# Patient Record
Sex: FEMALE | Race: WHITE | NOT HISPANIC OR LATINO | Employment: FULL TIME | URBAN - METROPOLITAN AREA
[De-identification: names, ages, dates, MRNs, and addresses within clinical notes are randomized per-mention and may not be internally consistent; named-entity substitution may affect disease eponyms.]

---

## 2017-01-12 ENCOUNTER — TELEPHONE (OUTPATIENT)
Dept: FAMILY MEDICINE | Facility: CLINIC | Age: 57
End: 2017-01-12

## 2017-01-12 NOTE — TELEPHONE ENCOUNTER
Patient called for RN HTN Panel Management.  Can come in for no-charge RN BP check or BP check at a [facility].  Left message for patient to return call to clinic.      Rosalinda Kaur RN

## 2017-01-13 NOTE — TELEPHONE ENCOUNTER
Patient is contacted and offered free bp ck with the RN.  Patient states she actually needs a physical and will call back to schedule as she is busy with her mother right now. Tosha ADAM RN

## 2017-04-17 ENCOUNTER — TELEPHONE (OUTPATIENT)
Dept: FAMILY MEDICINE | Facility: CLINIC | Age: 57
End: 2017-04-17

## 2017-04-17 DIAGNOSIS — I10 ESSENTIAL HYPERTENSION, BENIGN: ICD-10-CM

## 2017-04-17 RX ORDER — ATENOLOL 50 MG/1
TABLET ORAL
Qty: 90 TABLET | Refills: 0 | Status: CANCELLED | OUTPATIENT
Start: 2017-04-17

## 2017-04-17 NOTE — TELEPHONE ENCOUNTER
Atenolol      Last Written Prescription Date: 04/08/16  Last Fill Quantity: 90, # refills: 3    Last Office Visit with [facility], [facility] or [facility] prescribing provider:  04/08/16   Future Office Visit:    Next 5 appointments (look out 90 days)     Apr 21, 2017  9:00 AM CDT   PHYSICAL with Margarita Hope DO   [facility]    [address] [address]   [phone]                    BP Readings from Last 3 Encounters:   04/08/16 (!) 154/94   05/01/15 173/89   02/20/15 148/82

## 2017-04-20 NOTE — TELEPHONE ENCOUNTER
Reason for Call:  Other prescription    Detailed comments: Pt called and needs Atenolol refill today - She is out of this med.  Please call patient and advise.        Phone Number Patient can be reached at: Cell number on file:    Telephone Information:   Mobile [phone]       Best Time: any    Can we leave a detailed message on this number? YES    Call taken on 4/20/2017 at 12:49 PM by Sujatha Blanco

## 2017-04-21 ENCOUNTER — MYC MEDICAL ADVICE (OUTPATIENT)
Dept: FAMILY MEDICINE | Facility: CLINIC | Age: 57
End: 2017-04-21

## 2017-04-21 DIAGNOSIS — I10 ESSENTIAL HYPERTENSION, BENIGN: ICD-10-CM

## 2017-04-21 RX ORDER — ATENOLOL 50 MG/1
50 TABLET ORAL DAILY
Qty: 30 TABLET | Refills: 0 | Status: SHIPPED | OUTPATIENT
Start: 2017-04-21 | End: 2017-05-05

## 2017-04-21 NOTE — TELEPHONE ENCOUNTER
Patient takes BP at home, last BP was 4/19/17;  /90,  before she was out of medication  She has been out of Atenolol for two days  Appt scheduled with PCP 5/5/17  Advised patient to make RN clinic appt to take BP  Patient agreed with plan, transferred patient to appt line to make Rn clinic appt  Rx ordered, 30 tabs/0 refills    Atenolol  Last Written Prescription Date: 4/8/16  Last Fill Quantity: 90, # refills: 3    Last Office Visit with [facility], P or [facility] prescribing provider:  4/8/16  Future Office Visit:    Next 5 appointments (look out 90 days)     May 05, 2017  9:20 AM CDT   Javid Remy with Margariat Hope DO   [facility]    [address] [address]   [phone]                    BP Readings from Last 3 Encounters:   04/08/16 (!) 154/94   05/01/15 173/89   02/20/15 148/82     Nancy FIELD Rn

## 2017-04-21 NOTE — TELEPHONE ENCOUNTER
Medication is being filled for 1 time refill only due to:  Patient needs to be seen because it has been more than one year since last visit. Tosha ADAM RN

## 2017-04-26 ENCOUNTER — ALLIED HEALTH/NURSE VISIT (OUTPATIENT)
Dept: FAMILY MEDICINE | Facility: CLINIC | Age: 57
End: 2017-04-26
Payer: COMMERCIAL

## 2017-04-26 VITALS — SYSTOLIC BLOOD PRESSURE: 174 MMHG | HEART RATE: 73 BPM | DIASTOLIC BLOOD PRESSURE: 92 MMHG

## 2017-04-26 DIAGNOSIS — Z01.30 BLOOD PRESSURE CHECK: Primary | ICD-10-CM

## 2017-04-26 PROCEDURE — 99207 ZZC NO CHARGE NURSE ONLY: CPT

## 2017-04-26 NOTE — NURSING NOTE
Patient is here for a blood pressure check.  She monitors her blood pressures at home.  Patient has brought in some home readings today and they are 120's-130's/70's-80's when she is taking her medications.  Patient reports that she is feeling well.  Blood pressure taken x2 today and both readings are above goal.  Patient reports that her blood pressures always run higher when she comes into clinic and she has had her home cuff checked against the clinic cuff two times in the past 3 years.  Patient has appointment scheduled with PCP next week as it has been over a year since her last OV.    Sandy Ralph RN

## 2017-04-26 NOTE — MR AVS SNAPSHOT
After Visit Summary   4/26/2017    Mechelle Cee    MRN: [id number]           Patient Information     Date Of Birth          1960        Visit Information        Provider Department      4/26/2017 4:00 PM RIVAS LIMA/MESHA GUTIERREZ [facility]        Today's Diagnoses     Blood pressure check    -  1       Follow-ups after your visit        Your next 10 appointments already scheduled     May 05, 2017  9:20 AM CDT   Javid Remy with Margarita Hope, DO   [facility]    [address] [address]   [phone]              Who to contact     If you have questions or need follow up information about today's clinic visit or your schedule please contact [facility] directly at [phone].  Normal or non-critical lab and imaging results will be communicated to you by MyChart, letter or phone within 4 business days after the clinic has received the results. If you do not hear from us within 7 days, please contact the clinic through [employer] or phone. If you have a critical or abnormal lab result, we will notify you by phone as soon as possible.  Submit refill requests through [employer] or call your pharmacy and they will forward the refill request to us. Please allow 3 business days for your refill to be completed.          Additional Information About Your Visit        MyChart Information     [employer] gives you secure access to your electronic health record. If you see a primary care provider, you can also send messages to your care team and make appointments. If you have questions, please call your primary care clinic.  If you do not have a primary care provider, please call [phone] and they will assist you.        Care EveryWhere ID     This is your Care EveryWhere ID. This could be used by other organizations to access your [facility] medical records  [id number]        Your Vitals Were     Pulse                   73             Blood Pressure from Last 3 Encounters:   04/26/17 (!) 174/92   04/08/16 (!) 154/94   05/01/15 173/89    Weight from Last 3 Encounters:   04/08/16 167 lb 11.2 oz (76.1 kg)   05/01/15 160 lb (72.6 kg)   02/20/15 158 lb 6.4 oz (71.8 kg)              Today, you had the following     No orders found for display       Primary Care Provider Office Phone # Fax #    Margarita CollazoDO yancy [phone]       [facility] [address] [address]        Thank you!     Thank you for choosing [facility]  for your care. Our goal is always to provide you with excellent care. Hearing back from our patients is one way we can continue to improve our services. Please take a few minutes to complete the written survey that you may receive in the mail after your visit with us. Thank you!             Your Updated Medication List - Protect others around you: Learn how to safely use, store and throw away your medicines at www.disposemymeds.org.          This list is accurate as of: 4/26/17  4:21 PM.  Always use your most recent med list.                   Brand Name Dispense Instructions for use    atenolol 50 MG tablet    TENORMIN    30 tablet    Take 1 tablet (50 mg) by mouth daily       hydrochlorothiazide 25 MG tablet    HYDRODIURIL    90 tablet    Take 1 tablet (25 mg) by mouth daily       omeprazole 40 MG capsule    priLOSEC    30 capsule    Take 1 capsule (40 mg) by mouth daily Take 30-60 minutes before a meal.

## 2017-05-05 ENCOUNTER — OFFICE VISIT (OUTPATIENT)
Dept: FAMILY MEDICINE | Facility: CLINIC | Age: 57
End: 2017-05-05
Payer: COMMERCIAL

## 2017-05-05 VITALS
SYSTOLIC BLOOD PRESSURE: 143 MMHG | HEART RATE: 85 BPM | BODY MASS INDEX: 33.18 KG/M2 | DIASTOLIC BLOOD PRESSURE: 96 MMHG | HEIGHT: 60 IN | WEIGHT: 169 LBS | TEMPERATURE: 99.1 F

## 2017-05-05 DIAGNOSIS — Z12.4 SCREENING FOR MALIGNANT NEOPLASM OF CERVIX: ICD-10-CM

## 2017-05-05 DIAGNOSIS — Z11.59 NEED FOR HEPATITIS C SCREENING TEST: ICD-10-CM

## 2017-05-05 DIAGNOSIS — Z12.11 SPECIAL SCREENING FOR MALIGNANT NEOPLASMS, COLON: ICD-10-CM

## 2017-05-05 DIAGNOSIS — Z12.31 ENCOUNTER FOR SCREENING MAMMOGRAM FOR BREAST CANCER: ICD-10-CM

## 2017-05-05 DIAGNOSIS — I10 ESSENTIAL HYPERTENSION: ICD-10-CM

## 2017-05-05 DIAGNOSIS — E78.5 HYPERLIPIDEMIA LDL GOAL <100: ICD-10-CM

## 2017-05-05 DIAGNOSIS — R05.3 CHRONIC COUGH: ICD-10-CM

## 2017-05-05 DIAGNOSIS — Z00.00 ENCOUNTER FOR GENERAL ADULT MEDICAL EXAMINATION WITHOUT ABNORMAL FINDINGS: Primary | ICD-10-CM

## 2017-05-05 LAB
ANION GAP SERPL CALCULATED.3IONS-SCNC: 9 MMOL/L (ref 3–14)
BUN SERPL-MCNC: 13 MG/DL (ref 7–30)
CALCIUM SERPL-MCNC: 8.9 MG/DL (ref 8.5–10.1)
CHLORIDE SERPL-SCNC: 99 MMOL/L (ref 94–109)
CHOLEST SERPL-MCNC: 270 MG/DL
CO2 SERPL-SCNC: 30 MMOL/L (ref 20–32)
CREAT SERPL-MCNC: 0.78 MG/DL (ref 0.52–1.04)
GFR SERPL CREATININE-BSD FRML MDRD: 77 ML/MIN/1.7M2
GLUCOSE SERPL-MCNC: 98 MG/DL (ref 70–99)
HCV AB SERPL QL IA: NORMAL
HDLC SERPL-MCNC: 58 MG/DL
LDLC SERPL CALC-MCNC: 185 MG/DL
NONHDLC SERPL-MCNC: 212 MG/DL
POTASSIUM SERPL-SCNC: 3.7 MMOL/L (ref 3.4–5.3)
SODIUM SERPL-SCNC: 138 MMOL/L (ref 133–144)
TRIGL SERPL-MCNC: 136 MG/DL

## 2017-05-05 PROCEDURE — 99396 PREV VISIT EST AGE 40-64: CPT | Performed by: INTERNAL MEDICINE

## 2017-05-05 PROCEDURE — 87624 HPV HI-RISK TYP POOLED RSLT: CPT | Performed by: INTERNAL MEDICINE

## 2017-05-05 PROCEDURE — 80061 LIPID PANEL: CPT | Performed by: INTERNAL MEDICINE

## 2017-05-05 PROCEDURE — 86803 HEPATITIS C AB TEST: CPT | Performed by: INTERNAL MEDICINE

## 2017-05-05 PROCEDURE — 36415 COLL VENOUS BLD VENIPUNCTURE: CPT | Performed by: INTERNAL MEDICINE

## 2017-05-05 PROCEDURE — G0145 SCR C/V CYTO,THINLAYER,RESCR: HCPCS | Performed by: INTERNAL MEDICINE

## 2017-05-05 PROCEDURE — 80048 BASIC METABOLIC PNL TOTAL CA: CPT | Performed by: INTERNAL MEDICINE

## 2017-05-05 RX ORDER — HYDROCHLOROTHIAZIDE 25 MG/1
25 TABLET ORAL DAILY
Qty: 90 TABLET | Refills: 3 | Status: SHIPPED | OUTPATIENT
Start: 2017-05-05 | End: 2018-07-17

## 2017-05-05 RX ORDER — ATENOLOL 100 MG/1
100 TABLET ORAL DAILY
Qty: 90 TABLET | Refills: 3 | Status: SHIPPED | OUTPATIENT
Start: 2017-05-05 | End: 2017-09-19

## 2017-05-05 RX ORDER — OMEPRAZOLE 40 MG/1
40 CAPSULE, DELAYED RELEASE ORAL DAILY
Qty: 90 CAPSULE | Refills: 3 | Status: SHIPPED | OUTPATIENT
Start: 2017-05-05 | End: 2018-11-09

## 2017-05-05 NOTE — NURSING NOTE
"Chief Complaint   Patient presents with     Physical     pap     Hypertension     recheck     Refill Request       Initial /89 (BP Location: Left arm, Patient Position: Chair, Cuff Size: Adult Large)  Pulse 96  Temp 99.1  F (37.3  C) (Tympanic)  Ht 5' 0.25\" (1.53 m)  Wt 169 lb (76.7 kg)  BMI 32.73 kg/m2 Estimated body mass index is 32.73 kg/(m^2) as calculated from the following:    Height as of this encounter: 5' 0.25\" (1.53 m).    Weight as of this encounter: 169 lb (76.7 kg).  Medication Reconciliation: complete  "

## 2017-05-05 NOTE — MR AVS SNAPSHOT
After Visit Summary   5/5/2017    Mechelle Cee    MRN: [id number]           Patient Information     Date Of Birth          1960        Visit Information        Provider Department      5/5/2017 9:20 AM Margarita Hope,  [facility]        Today's Diagnoses     Encounter for general adult medical examination without abnormal findings    -  1    Chronic cough        Essential hypertension        Need for hepatitis C screening test        Screening for malignant neoplasm of cervix        Encounter for screening mammogram for breast cancer        Special screening for malignant neoplasms, colon        Hyperlipidemia LDL goal <100          Care Instructions          Thank you for choosing [facility].  You may be receiving a survey in the mail from [employer] regarding your visit today.  Please take a few minutes to complete and return the survey to let us know how we are doing.      If you have questions or concerns, please contact us via [employer] or you can contact your care team at [phone].    Our Clinic hours are:  Monday 6:40 am  to 7:00 pm  Tuesday -Friday 6:40 am to 5:00 pm    The [place] outpatient lab hours are:  Monday - Friday 6:10 am to 4:45 pm  Saturdays 7:00 am to 11:00 am  Appointments are required, call [phone]    If you have clinical questions after hours or would like to schedule an appointment,  call the clinic at [phone].      [facility] [address] [place] [address] [address]     Preventive Health Recommendations  Female Ages 50 - 64    Yearly exam: See your health care provider every year in order to  o Review health changes.   o Discuss preventive care.    o Review your medicines if your doctor has prescribed any.      Get a Pap test every three years (unless you have an abnormal result and your provider advises testing more often).    If you get Pap tests with HPV test, you only need to test every 5 years, unless you have an  abnormal result.     You do not need a Pap test if your uterus was removed (hysterectomy) and you have not had cancer.    You should be tested each year for STDs (sexually transmitted diseases) if you're at risk.     Have a mammogram every 1 to 2 years.    Have a colonoscopy at age 50, or have a yearly FIT test (stool test). These exams screen for colon cancer.      Have a cholesterol test every 5 years, or more often if advised.    Have a diabetes test (fasting glucose) every three years. If you are at risk for diabetes, you should have this test more often.     If you are at risk for osteoporosis (brittle bone disease), think about having a bone density scan (DEXA).    Shots: Get a flu shot each year. Get a tetanus shot every 10 years.    Nutrition:     Eat at least 5 servings of fruits and vegetables each day.    Eat whole-grain bread, whole-wheat pasta and brown rice instead of white grains and rice.    Talk to your provider about Calcium and Vitamin D.     Lifestyle    Exercise at least 150 minutes a week (30 minutes a day, 5 days a week). This will help you control your weight and prevent disease.    Limit alcohol to one drink per day.    No smoking.     Wear sunscreen to prevent skin cancer.     See your dentist every six months for an exam and cleaning.    See your eye doctor every 1 to 2 years.      1. If the episodes of palpitations and dizziness persist, may have you wear a heart monitor.  2. Increase atenolol to 100 mg once daily.  Let Dr. Hope know if you have side effects  3. Blood work today  4. Return to clinic 1 year  5. Return FIT test  6. You are due for a mammogram.  Please call [phone] to schedule.  7. Take blood pressure after resting for 5 min, then take every 1 min for 2-3 readings and average reading.  8. Black cohosh for hot flashes  9. Return to clinic if you want skin tags removed        Follow-ups after your visit        Future tests that were ordered for you today     Open Future  "Orders        Priority Expected Expires Ordered    *MA Screening Digital Bilateral Routine  5/5/2018 5/5/2017    Fecal colorectal cancer screen (FIT) Routine 5/26/2017 7/28/2017 5/5/2017            Who to contact     If you have questions or need follow up information about today's clinic visit or your schedule please contact [facility] directly at [phone].  Normal or non-critical lab and imaging results will be communicated to you by [employer], letter or phone within 4 business days after the clinic has received the results. If you do not hear from us within 7 days, please contact the clinic through [employer] or phone. If you have a critical or abnormal lab result, we will notify you by phone as soon as possible.  Submit refill requests through [employer] or call your pharmacy and they will forward the refill request to us. Please allow 3 business days for your refill to be completed.          Additional Information About Your Visit        [employer] Information     [employer] gives you secure access to your electronic health record. If you see a primary care provider, you can also send messages to your care team and make appointments. If you have questions, please call your primary care clinic.  If you do not have a primary care provider, please call [phone] and they will assist you.        Care EveryWhere ID     This is your Care EveryWhere ID. This could be used by other organizations to access your [facility] medical records  [id number]        Your Vitals Were     Pulse Temperature Height BMI (Body Mass Index)          96 99.1  F (37.3  C) (Tympanic) 5' 0.25\" (1.53 m) 32.73 kg/m2         Blood Pressure from Last 3 Encounters:   05/05/17 149/89   04/26/17 (!) 174/92   04/08/16 (!) 154/94    Weight from Last 3 Encounters:   05/05/17 169 lb (76.7 kg)   04/08/16 167 lb 11.2 oz (76.1 kg)   05/01/15 160 lb (72.6 kg)              We Performed the Following     Basic metabolic panel     Hepatitis C antibody  "    HPV High Risk Types DNA Cervical     Lipid Profile (Chol, Trig, HDL, LDL calc)     Pap imaged thin layer screen with HPV - recommended age 30 - 65 years (select HPV order below)          Today's Medication Changes          These changes are accurate as of: 5/5/17 10:12 AM.  If you have any questions, ask your nurse or doctor.               These medicines have changed or have updated prescriptions.        Dose/Directions    atenolol 100 MG tablet   Commonly known as:  TENORMIN   This may have changed:    - medication strength  - how much to take   Used for:  Essential hypertension   Changed by:  Margarita Hope DO        Dose:  100 mg   Take 1 tablet (100 mg) by mouth daily   Quantity:  90 tablet   Refills:  3            Where to get your medicines      These medications were sent to [facility] - [place], [place] - [address]   [address] [facility] [address]     Phone:  [phone]     atenolol 100 MG tablet    hydrochlorothiazide 25 MG tablet    omeprazole 40 MG capsule                Primary Care Provider Office Phone # Fax #    Margarita Hope [phone]       [facility] [address] [address]        Thank you!     Thank you for choosing [facility]  for your care. Our goal is always to provide you with excellent care. Hearing back from our patients is one way we can continue to improve our services. Please take a few minutes to complete the written survey that you may receive in the mail after your visit with us. Thank you!             Your Updated Medication List - Protect others around you: Learn how to safely use, store and throw away your medicines at www.disposemymeds.org.          This list is accurate as of: 5/5/17 10:12 AM.  Always use your most recent med list.                   Brand Name Dispense Instructions for use    atenolol 100 MG tablet    TENORMIN    90 tablet    Take 1 tablet (100 mg) by mouth  daily       hydrochlorothiazide 25 MG tablet    HYDRODIURIL    90 tablet    Take 1 tablet (25 mg) by mouth daily       omeprazole 40 MG capsule    priLOSEC    90 capsule    Take 1 capsule (40 mg) by mouth daily Take 30-60 minutes before a meal.

## 2017-05-05 NOTE — PROGRESS NOTES
SUBJECTIVE:     CC: Mechelle Cee is an 57 year old woman who presents for preventive health visit.     Healthy Habits:    Do you get at least three servings of calcium containing foods daily (dairy, green leafy vegetables, etc.)? yes    Amount of exercise or daily activities, outside of work: 1-2 day(s) per week    Problems taking medications regularly No    Medication side effects: No    Have you had an eye exam in the past two years? yes    Do you see a dentist twice per year? yes    Do you have sleep apnea, excessive snoring or daytime drowsiness?no    Chief Complaint   Patient presents with     Physical     pap     Hypertension     recheck     Refill Request     Hypertension:  Uncontrolled.  130-157.  Has new cuff.  Our cuff checked against her, ours was 149/89, hers 163/100  Taking meds in PM. Feeling palpitations and occasional dizziness.  Occurs every 2 weeks or so.  Pulse 70s mostly.  Episodes last < 1 min.  She gets sensation that she has to cough, and she does, and episode can resolve.    Cough: thinks due to acid reflux. prilosec helps, only taking PRN.      Today's PHQ-2 Score:   PHQ-2 ( 1999 [employer]) 5/5/2017 4/18/2017   Q1: Little interest or pleasure in doing things 0 -   Q2: Feeling down, depressed or hopeless 0 -   PHQ-2 Score 0 -   Little interest or pleasure in doing things - Not at all   Feeling down, depressed or hopeless - Not at all   PHQ-2 Score - 0       Abuse: Current or Past(Physical, Sexual or Emotional)- No  Do you feel safe in your environment - Yes    Social History   Substance Use Topics     Smoking status: Never Smoker     Smokeless tobacco: Never Used     Alcohol use Yes      Comment: beer or wine 3-4 a week     The patient does not drink >3 drinks per day nor >7 drinks per week.    Recent Labs   Lab Test  02/10/15   0934   CHOL  200*   HDL  54   LDL  126   TRIG  102   CHOLHDLRATIO  3.7       Reviewed orders with patient.  Reviewed health maintenance and updated orders  accordingly - No    Mammo Decision Support:  Patient over age 50, mutual decision to screen reflected in health maintenance.    Pertinent mammograms are reviewed under the imaging tab.  History of abnormal Pap smear: NO - age 30- 65 PAP every 3 years recommended    Reviewed and updated as needed this visit by clinical staff  Tobacco  Allergies         Reviewed and updated as needed this visit by Provider          ROS: negative except as noted in HPI  C: NEGATIVE for fever, chills, change in weight  I: NEGATIVE for worrisome rashes, moles or lesions  E: NEGATIVE for vision changes or irritation  ENT: NEGATIVE for ear, mouth and throat problems  R: NEGATIVE for significant cough or SOB  B: NEGATIVE for masses, tenderness or discharge  CV: NEGATIVE for chest pain, palpitations or peripheral edema  GI: NEGATIVE for nausea, abdominal pain, heartburn, or change in bowel habits  : NEGATIVE for unusual urinary or vaginal symptoms. No vaginal bleeding.  M: NEGATIVE for significant arthralgias or myalgia  N: NEGATIVE for weakness, dizziness or paresthesias  P: NEGATIVE for changes in mood or affect     Problem list, Medication list, Allergies, and Medical/Social/Surgical histories reviewed in [facility] and updated as appropriate.  Labs reviewed in [facility]  Current Outpatient Prescriptions   Medication Sig Dispense Refill     omeprazole (PRILOSEC) 40 MG capsule Take 1 capsule (40 mg) by mouth daily Take 30-60 minutes before a meal. 90 capsule 3     hydrochlorothiazide (HYDRODIURIL) 25 MG tablet Take 1 tablet (25 mg) by mouth daily 90 tablet 3     atenolol (TENORMIN) 100 MG tablet Take 1 tablet (100 mg) by mouth daily 90 tablet 3     [DISCONTINUED] atenolol (TENORMIN) 50 MG tablet Take 1 tablet (50 mg) by mouth daily 30 tablet 0     [DISCONTINUED] hydrochlorothiazide (HYDRODIURIL) 25 MG tablet Take 1 tablet (25 mg) by mouth daily 90 tablet 1     OBJECTIVE:     /89 (BP Location: Left arm, Patient Position: Chair, Cuff Size:  "Adult Large)  Pulse 96  Temp 99.1  F (37.3  C) (Tympanic)  Ht 5' 0.25\" (1.53 m)  Wt 169 lb (76.7 kg)  BMI 32.73 kg/m2  EXAM:  GENERAL APPEARANCE: healthy, alert and no distress  EYES: Eyes grossly normal to inspection, PERRL and conjunctivae and sclerae normal  HENT: ear canals and TM's normal, nose and mouth without ulcers or lesions, oropharynx clear and oral mucous membranes moist  NECK: no adenopathy, no asymmetry, masses, or scars and thyroid normal to palpation  RESP: lungs clear to auscultation - no rales, rhonchi or wheezes  BREAST: normal without masses, tenderness or nipple discharge and no palpable axillary masses or adenopathy  CV: regular rate and rhythm, normal S1 S2, no S3 or S4, no murmur, click or rub, no peripheral edema and peripheral pulses strong  ABDOMEN: soft, nontender, no hepatosplenomegaly, no masses and bowel sounds normal   (female): normal female external genitalia, normal urethral meatus, vaginal mucosal atrophy noted, normal cervix, adnexae, and uterus without masses or abnormal discharge.  Pap collected  MS: no musculoskeletal defects are noted and gait is age appropriate without ataxia  SKIN: no suspicious lesions or rashes.  Skin tags in axilla bilateral and inner upper thighs  NEURO: Normal strength and tone, sensory exam grossly normal, mentation intact and speech normal  PSYCH: mentation appears normal and affect normal/bright    ASSESSMENT/PLAN:     1. Encounter for general adult medical examination without abnormal findings    2. Chronic cough well-controlled with intermittent PPI.   - omeprazole (PRILOSEC) 40 MG capsule; Take 1 capsule (40 mg) by mouth daily Take 30-60 minutes before a meal.  Dispense: 90 capsule; Refill: 3    3. Essential hypertension -  uncontrolled. Increase atenolol to 100 mg. RN BP check in 2 weeks. Let us know if side effects   - hydrochlorothiazide (HYDRODIURIL) 25 MG tablet; Take 1 tablet (25 mg) by mouth daily  Dispense: 90 tablet; Refill: 3  - " "atenolol (TENORMIN) 100 MG tablet; Take 1 tablet (100 mg) by mouth daily  Dispense: 90 tablet; Refill: 3  - Basic metabolic panel    4. Need for hepatitis C screening test  - Hepatitis C antibody    5. Screening for malignant neoplasm of cervix  - Pap imaged thin layer screen with HPV - recommended age 30 - 65 years (select HPV order below)  - HPV High Risk Types DNA Cervical    6. Encounter for screening mammogram for breast cancer  - *MA Screening Digital Bilateral; Future    7. Special screening for malignant neoplasms, colon  - Fecal colorectal cancer screen (FIT); Future    8. Hyperlipidemia LDL goal <100  - Lipid Profile (Chol, Trig, HDL, LDL calc)    COUNSELING:   Reviewed preventive health counseling, as reflected in patient instructions         reports that she has never smoked. She has never used smokeless tobacco.    Estimated body mass index is 32.73 kg/(m^2) as calculated from the following:    Height as of this encounter: 5' 0.25\" (1.53 m).    Weight as of this encounter: 169 lb (76.7 kg).   Weight management plan: Discussed healthy diet and exercise guidelines and patient will follow up in 12 months in clinic to re-evaluate.    Counseling Resources:  ATP IV Guidelines  Pooled Cohorts Equation Calculator  Breast Cancer Risk Calculator  FRAX Risk Assessment  ICSI Preventive Guidelines  Dietary Guidelines for Americans, 2010  USDA's MyPlate  ASA Prophylaxis  Lung CA Screening    Margarita Hope,   [facility]  "

## 2017-05-05 NOTE — PATIENT INSTRUCTIONS
Thank you for choosing [facility].  You may be receiving a survey in the mail from Vicki Anderson regarding your visit today.  Please take a few minutes to complete and return the survey to let us know how we are doing.      If you have questions or concerns, please contact us via [employer] or you can contact your care team at [phone].    Our Clinic hours are:  Monday 6:40 am  to 7:00 pm  Tuesday -Friday 6:40 am to 5:00 pm    The [place] outpatient lab hours are:  Monday - Friday 6:10 am to 4:45 pm  Saturdays 7:00 am to 11:00 am  Appointments are required, call [phone]    If you have clinical questions after hours or would like to schedule an appointment,  call the clinic at [phone].      [facility] [address] [place] [address] [address]     Preventive Health Recommendations  Female Ages 50 - 64    Yearly exam: See your health care provider every year in order to  o Review health changes.   o Discuss preventive care.    o Review your medicines if your doctor has prescribed any.      Get a Pap test every three years (unless you have an abnormal result and your provider advises testing more often).    If you get Pap tests with HPV test, you only need to test every 5 years, unless you have an abnormal result.     You do not need a Pap test if your uterus was removed (hysterectomy) and you have not had cancer.    You should be tested each year for STDs (sexually transmitted diseases) if you're at risk.     Have a mammogram every 1 to 2 years.    Have a colonoscopy at age 50, or have a yearly FIT test (stool test). These exams screen for colon cancer.      Have a cholesterol test every 5 years, or more often if advised.    Have a diabetes test (fasting glucose) every three years. If you are at risk for diabetes, you should have this test more often.     If you are at risk for osteoporosis (brittle bone disease), think about having a bone density scan (DEXA).    Shots: Get a flu shot each  year. Get a tetanus shot every 10 years.    Nutrition:     Eat at least 5 servings of fruits and vegetables each day.    Eat whole-grain bread, whole-wheat pasta and brown rice instead of white grains and rice.    Talk to your provider about Calcium and Vitamin D.     Lifestyle    Exercise at least 150 minutes a week (30 minutes a day, 5 days a week). This will help you control your weight and prevent disease.    Limit alcohol to one drink per day.    No smoking.     Wear sunscreen to prevent skin cancer.     See your dentist every six months for an exam and cleaning.    See your eye doctor every 1 to 2 years.      1. If the episodes of palpitations and dizziness persist, may have you wear a heart monitor.  2. Increase atenolol to 100 mg once daily.  Let Dr. Hope know if you have side effects  3. Blood work today  4. Return to clinic 1 year  5. Return FIT test  6. You are due for a mammogram.  Please call [phone] to schedule.  7. Take blood pressure after resting for 5 min, then take every 1 min for 2-3 readings and average reading.  8. Black cohosh for hot flashes  9. Return to clinic if you want skin tags removed

## 2017-05-08 DIAGNOSIS — E78.5 HYPERLIPIDEMIA LDL GOAL <160: Primary | ICD-10-CM

## 2017-05-09 LAB
COPATH REPORT: NORMAL
PAP: NORMAL

## 2017-05-09 NOTE — PROGRESS NOTES
Negative for Hepatitis C.  Kidney function, blood sugar, electrolytes are normal.  Cholesterol is extremely high compared to previous.  It is in the danger zone.  Recommend to try lifestyle changes first - healthy diet, limit processed foods, more lean meats, less starchy/sugary foods, more fruits/veggies and whole grains, along with exercise.    Recheck in 2 months.  If still elevated, I would strongly recommend a cholesterol lowering medication such as atorvastatin 20 mg once daily.

## 2017-05-10 LAB
FINAL DIAGNOSIS: NORMAL
HPV HR 12 DNA CVX QL NAA+PROBE: NEGATIVE
HPV16 DNA SPEC QL NAA+PROBE: NEGATIVE
HPV18 DNA SPEC QL NAA+PROBE: NEGATIVE
SPECIMEN DESCRIPTION: NORMAL

## 2017-05-19 ENCOUNTER — ALLIED HEALTH/NURSE VISIT (OUTPATIENT)
Dept: FAMILY MEDICINE | Facility: CLINIC | Age: 57
End: 2017-05-19
Payer: COMMERCIAL

## 2017-05-19 VITALS
HEART RATE: 74 BPM | DIASTOLIC BLOOD PRESSURE: 84 MMHG | SYSTOLIC BLOOD PRESSURE: 139 MMHG | OXYGEN SATURATION: 98 % | RESPIRATION RATE: 18 BRPM

## 2017-05-19 DIAGNOSIS — I10 ESSENTIAL HYPERTENSION: Primary | ICD-10-CM

## 2017-05-19 PROCEDURE — 99207 ZZC NO CHARGE NURSE ONLY: CPT

## 2017-05-19 NOTE — NURSING NOTE
Patient comes into the clinic today for a BP CK.  Medications and allergies are gone over with the patient and are up to date.  Tosha ADAM RN

## 2017-05-19 NOTE — MR AVS SNAPSHOT
After Visit Summary   5/19/2017    Mechelle Cee    MRN: [id number]           Patient Information     Date Of Birth          1960        Visit Information        Provider Department      5/19/2017 1:30 PM RIVAS LIMA/MESHA GUTIERREZ [facility]        Today's Diagnoses     Essential hypertension    -  1       Follow-ups after your visit        Who to contact     If you have questions or need follow up information about today's clinic visit or your schedule please contact [facility] directly at [phone].  Normal or non-critical lab and imaging results will be communicated to you by [employer], letter or phone within 4 business days after the clinic has received the results. If you do not hear from us within 7 days, please contact the clinic through [employer] or phone. If you have a critical or abnormal lab result, we will notify you by phone as soon as possible.  Submit refill requests through [employer] or call your pharmacy and they will forward the refill request to us. Please allow 3 business days for your refill to be completed.          Additional Information About Your Visit        MyChart Information     [employer] gives you secure access to your electronic health record. If you see a primary care provider, you can also send messages to your care team and make appointments. If you have questions, please call your primary care clinic.  If you do not have a primary care provider, please call [phone] and they will assist you.        Care EveryWhere ID     This is your Care EveryWhere ID. This could be used by other organizations to access your [place] medical records  [id number]        Your Vitals Were     Pulse Respirations Pulse Oximetry             74 18 98%          Blood Pressure from Last 3 Encounters:   05/19/17 139/84   05/05/17 (!) 143/96   04/26/17 (!) 174/92    Weight from Last 3 Encounters:   05/05/17 169 lb (76.7 kg)   04/08/16 167 lb 11.2 oz (76.1 kg)   05/01/15  160 lb (72.6 kg)              Today, you had the following     No orders found for display       Primary Care Provider Office Phone # Fax #    Margarita Hope [phone]       [facility] [address] [facility] [address]        Thank you!     Thank you for choosing [facility]  for your care. Our goal is always to provide you with excellent care. Hearing back from our patients is one way we can continue to improve our services. Please take a few minutes to complete the written survey that you may receive in the mail after your visit with us. Thank you!             Your Updated Medication List - Protect others around you: Learn how to safely use, store and throw away your medicines at www.disposemymeds.org.          This list is accurate as of: 5/19/17  1:52 PM.  Always use your most recent med list.                   Brand Name Dispense Instructions for use    atenolol 100 MG tablet    TENORMIN    90 tablet    Take 1 tablet (100 mg) by mouth daily       hydrochlorothiazide 25 MG tablet    HYDRODIURIL    90 tablet    Take 1 tablet (25 mg) by mouth daily       omeprazole 40 MG capsule    priLOSEC    90 capsule    Take 1 capsule (40 mg) by mouth daily Take 30-60 minutes before a meal.

## 2017-05-23 DIAGNOSIS — I10 ESSENTIAL HYPERTENSION WITH GOAL BLOOD PRESSURE LESS THAN 130/80: ICD-10-CM

## 2017-05-31 RX ORDER — HYDROCHLOROTHIAZIDE 25 MG/1
TABLET ORAL
Qty: 90 TABLET | Refills: 0 | OUTPATIENT
Start: 2017-05-31

## 2017-07-10 PROCEDURE — 82274 ASSAY TEST FOR BLOOD FECAL: CPT | Performed by: INTERNAL MEDICINE

## 2017-07-11 DIAGNOSIS — Z12.11 SPECIAL SCREENING FOR MALIGNANT NEOPLASMS, COLON: ICD-10-CM

## 2017-07-11 LAB — HEMOCCULT STL QL IA: NEGATIVE

## 2017-09-18 ENCOUNTER — TELEPHONE (OUTPATIENT)
Dept: FAMILY MEDICINE | Facility: CLINIC | Age: 57
End: 2017-09-18

## 2017-09-18 DIAGNOSIS — I10 ESSENTIAL HYPERTENSION: ICD-10-CM

## 2017-09-18 RX ORDER — ATENOLOL 100 MG/1
100 TABLET ORAL DAILY
Qty: 90 TABLET | Refills: 3 | Status: CANCELLED | OUTPATIENT
Start: 2017-09-18

## 2017-09-18 NOTE — TELEPHONE ENCOUNTER
Please provide atenolol alternative:  atenolol (TENORMIN) 100 MG tablet 90 tablet 3 5/5/2017  No      Sig: Take 1 tablet (100 mg) by mouth daily     Routing to provider.  Marely FIELD RN

## 2017-09-18 NOTE — TELEPHONE ENCOUNTER
Received a fax from [facility] regarding this patients Atenolol 100mg. [facility] is asking for a replacement for this medication. Please send an Rx.  Татьяна Montano  [facility]  Flex

## 2017-09-19 RX ORDER — METOPROLOL SUCCINATE 200 MG/1
200 TABLET, EXTENDED RELEASE ORAL DAILY
Qty: 90 TABLET | Refills: 3 | Status: SHIPPED | OUTPATIENT
Start: 2017-09-19 | End: 2018-10-08

## 2017-09-20 ENCOUNTER — TELEPHONE (OUTPATIENT)
Dept: FAMILY MEDICINE | Facility: CLINIC | Age: 57
End: 2017-09-20

## 2017-09-20 NOTE — TELEPHONE ENCOUNTER
"Notified her, \"ok, yes, I am just worried it will make me dizzy or something, Ok, thanks I appreciate the call, I will try it, ok, thanks, \"  Kelly Dumont RNC    "

## 2017-09-20 NOTE — TELEPHONE ENCOUNTER
Reason for Call:  Medication or medication refill:    Do you use a [facility]?  Name of the pharmacy and phone number for the current request:   [facility]     Name of the medication requested:  metoprolol (TOPROL-XL) 200 MG 24 hr tablet    Other request: patient is questioning  dose of new BP medication  metoprolol (TOPROL-XL) 200 MG 24 hr tablet  She was taking   atenolol (TENORMIN) 100 MG tablet   please call and advise               Can we leave a detailed message on this number? YES    Phone number patient can be reached at: Cell number on file:    Telephone Information:   Mobile [phone]       Best Time: any   Anne Gonzales- [facility]      Call taken on 9/20/2017 at 12:49 PM by Anne Gonzales

## 2017-09-20 NOTE — TELEPHONE ENCOUNTER
Dr Hope, please see note below and clarify.   Was on   atenolol (TENORMIN) 100 MG tablet (Discontinued) 90 tablet 3 5/5/2017 9/19/2017 No   Sig: Take 1 tablet (100 mg) by mouth daily      and now changed to   metoprolol (TOPROL-XL) 200 MG 24 hr tablet 90 tablet 3 9/19/2017  --   Sig: Take 1 tablet (200 mg) by mouth daily     Is that what you want her on?   Is it because it is XL/ extended release?   Kelly Dumont RNC

## 2017-11-01 ENCOUNTER — OFFICE VISIT (OUTPATIENT)
Dept: FAMILY MEDICINE | Facility: CLINIC | Age: 57
End: 2017-11-01
Payer: COMMERCIAL

## 2017-11-01 VITALS
WEIGHT: 168.4 LBS | HEIGHT: 60 IN | OXYGEN SATURATION: 98 % | TEMPERATURE: 98.4 F | SYSTOLIC BLOOD PRESSURE: 164 MMHG | HEART RATE: 88 BPM | BODY MASS INDEX: 33.06 KG/M2 | DIASTOLIC BLOOD PRESSURE: 84 MMHG

## 2017-11-01 DIAGNOSIS — J02.9 VIRAL PHARYNGITIS: Primary | ICD-10-CM

## 2017-11-01 LAB
DEPRECATED S PYO AG THROAT QL EIA: NORMAL
SPECIMEN SOURCE: NORMAL

## 2017-11-01 PROCEDURE — 87081 CULTURE SCREEN ONLY: CPT | Performed by: INTERNAL MEDICINE

## 2017-11-01 PROCEDURE — 87880 STREP A ASSAY W/OPTIC: CPT | Performed by: INTERNAL MEDICINE

## 2017-11-01 PROCEDURE — 99213 OFFICE O/P EST LOW 20 MIN: CPT | Performed by: INTERNAL MEDICINE

## 2017-11-01 NOTE — PATIENT INSTRUCTIONS
Viral Pharyngitis (Sore Throat)    You (or your child, if your child is the patient) have pharyngitis (sore throat). This infection is caused by a virus. It can cause throat pain that is worse when swallowing, aching all over, headache, and fever. The infection may be spread by coughing, kissing, or touching others after touching your mouth or nose. Antibiotic medications do not work against viruses, so they are not used for treating this condition.  Home care    If your symptoms are severe, rest at home. Return to work or school when you feel well enough.     Drink plenty of fluids to avoid dehydration.    For children: Use acetaminophen for fever, fussiness or discomfort. In infants over six months of age, you may use ibuprofen instead of acetaminophen. (NOTE: If your child has chronic liver or kidney disease or ever had a stomach ulcer or GI bleeding, talk with your doctor before using these medicines.) (NOTE: Aspirin should never be used in anyone under 18 years of age who is ill with a fever. It may cause severe liver damage.)     For adults: You may use acetaminophen or ibuprofen to control pain or fever, unless another medicine was prescribed for this. (NOTE: If you have chronic liver or kidney disease or ever had a stomach ulcer or GI bleeding, talk with your doctor before using these medicines.)    Throat lozenges or numbing throat sprays can help reduce pain. Gargling with warm salt water will also help reduce throat pain. For this, dissolve 1/2 teaspoon of salt in 1 glass of warm water. To help soothe a sore throat, children can sip on juice or a popsicle. Children 5 years and older can also suck on a lollipop or hard candy.    Avoid salty or spicy foods, which can be irritating to the throat.  Follow-up care  Follow up with your healthcare provider or our staff if you are not improving over the next week.  When to seek medical advice  Call your healthcare provider right away if any of these  occur:    Fever as directed by your doctor.  For children, seek care if:    Your child is of any age and has repeated fevers above 104 F (40 C).    Your child is younger than 2 years of age and has a fever of 100.4 F (38 C) that continues for more than 1 day.    Your child is 2 years old or older and has a fever of 100.4 F (38 C) that continues for more than 3 days.    New or worsening ear pain, sinus pain, or headache    Painful lumps in the back of neck    Stiff neck    Lymph nodes are getting larger    Inability to swallow liquids, excessive drooling, or inability to open mouth wide due to throat pain    Signs of dehydration (very dark urine or no urine, sunken eyes, dizziness)    Trouble breathing or noisy breathing    Muffled voice    New rash    Child appears to be getting sicker  Date Last Reviewed: 4/13/2015 2000-2017 [employer]. [address] [place] [address], [place] [address]. All rights reserved. This information is not intended as a substitute for professional medical care. Always follow your healthcare professional's instructions.

## 2017-11-01 NOTE — NURSING NOTE
"Chief Complaint   Patient presents with     Pharyngitis     x 1 day, no fever associated      Panel Management     mammogram order placed 5/5/17, gave patient reminder       Initial /84 (BP Location: Left arm, Patient Position: Chair, Cuff Size: Adult Regular)  Pulse 88  Temp 98.4  F (36.9  C) (Tympanic)  Ht 5' 0.25\" (1.53 m)  Wt 168 lb 6.4 oz (76.4 kg)  SpO2 98%  BMI 32.62 kg/m2 Estimated body mass index is 32.62 kg/(m^2) as calculated from the following:    Height as of this encounter: 5' 0.25\" (1.53 m).    Weight as of this encounter: 168 lb 6.4 oz (76.4 kg).  Medication Reconciliation: complete   Patricia FORREST CMA (AAMA)    "

## 2017-11-01 NOTE — MR AVS SNAPSHOT
After Visit Summary   11/1/2017    Mechelle Cee    MRN: [id number]           Patient Information     Date Of Birth          1960        Visit Information        Provider Department      11/1/2017 4:00 PM Caio Faustin MD [facility]        Today's Diagnoses     Throat pain    -  1      Care Instructions      Viral Pharyngitis (Sore Throat)    You (or your child, if your child is the patient) have pharyngitis (sore throat). This infection is caused by a virus. It can cause throat pain that is worse when swallowing, aching all over, headache, and fever. The infection may be spread by coughing, kissing, or touching others after touching your mouth or nose. Antibiotic medications do not work against viruses, so they are not used for treating this condition.  Home care    If your symptoms are severe, rest at home. Return to work or school when you feel well enough.     Drink plenty of fluids to avoid dehydration.    For children: Use acetaminophen for fever, fussiness or discomfort. In infants over six months of age, you may use ibuprofen instead of acetaminophen. (NOTE: If your child has chronic liver or kidney disease or ever had a stomach ulcer or GI bleeding, talk with your doctor before using these medicines.) (NOTE: Aspirin should never be used in anyone under 18 years of age who is ill with a fever. It may cause severe liver damage.)     For adults: You may use acetaminophen or ibuprofen to control pain or fever, unless another medicine was prescribed for this. (NOTE: If you have chronic liver or kidney disease or ever had a stomach ulcer or GI bleeding, talk with your doctor before using these medicines.)    Throat lozenges or numbing throat sprays can help reduce pain. Gargling with warm salt water will also help reduce throat pain. For this, dissolve 1/2 teaspoon of salt in 1 glass of warm water. To help soothe a sore throat, children can sip on juice or a popsicle. Children  5 years and older can also suck on a lollipop or hard candy.    Avoid salty or spicy foods, which can be irritating to the throat.  Follow-up care  Follow up with your healthcare provider or our staff if you are not improving over the next week.  When to seek medical advice  Call your healthcare provider right away if any of these occur:    Fever as directed by your doctor.  For children, seek care if:    Your child is of any age and has repeated fevers above 104 F (40 C).    Your child is younger than 2 years of age and has a fever of 100.4 F (38 C) that continues for more than 1 day.    Your child is 2 years old or older and has a fever of 100.4 F (38 C) that continues for more than 3 days.    New or worsening ear pain, sinus pain, or headache    Painful lumps in the back of neck    Stiff neck    Lymph nodes are getting larger    Inability to swallow liquids, excessive drooling, or inability to open mouth wide due to throat pain    Signs of dehydration (very dark urine or no urine, sunken eyes, dizziness)    Trouble breathing or noisy breathing    Muffled voice    New rash    Child appears to be getting sicker  Date Last Reviewed: 4/13/2015 2000-2017 [employer]. [address] [place] [address]. All rights reserved. This information is not intended as a substitute for professional medical care. Always follow your healthcare professional's instructions.                Follow-ups after your visit        Who to contact     If you have questions or need follow up information about today's clinic visit or your schedule please contact [facility] directly at [phone].  Normal or non-critical lab and imaging results will be communicated to you by MyChart, letter or phone within 4 business days after the clinic has received the results. If you do not hear from us within 7 days, please contact the clinic through MyChart or phone. If you have a critical or abnormal lab result, we  "will notify you by phone as soon as possible.  Submit refill requests through [employer] or call your pharmacy and they will forward the refill request to us. Please allow 3 business days for your refill to be completed.          Additional Information About Your Visit        [employer] Information     [employer] gives you secure access to your electronic health record. If you see a primary care provider, you can also send messages to your care team and make appointments. If you have questions, please call your primary care clinic.  If you do not have a primary care provider, please call [phone] and they will assist you.        Care EveryWhere ID     This is your Care EveryWhere ID. This could be used by other organizations to access your [place] medical records  [id number]        Your Vitals Were     Pulse Temperature Height Pulse Oximetry BMI (Body Mass Index)       88 98.4  F (36.9  C) (Tympanic) 5' 0.25\" (1.53 m) 98% 32.62 kg/m2        Blood Pressure from Last 3 Encounters:   11/01/17 164/84   05/19/17 139/84   05/05/17 (!) 143/96    Weight from Last 3 Encounters:   11/01/17 168 lb 6.4 oz (76.4 kg)   05/05/17 169 lb (76.7 kg)   04/08/16 167 lb 11.2 oz (76.1 kg)              We Performed the Following     Strep, Rapid Screen        Primary Care Provider Office Phone # Fax #    Margarita Chikis Hope [phone] [address] [address]        Equal Access to Services     SHAHZAD BOYKIN AH: Hadii aad ku hadasho Soomaali, waaxda luqadaha, qaybta kaalmada adeegyada, waxnick alvarado. So [facility] [phone].    ATENCIÓN: Si artemla pietro, tiene a singh disposición servicios gratuitos de asistencia lingüística. Llame al [phone].    We comply with applicable federal civil rights laws and [place] laws. We do not discriminate on the basis of race, color, national origin, age, disability, sex, sexual orientation, or gender identity.            Thank you!     Thank you " for choosing [facility]  for your care. Our goal is always to provide you with excellent care. Hearing back from our patients is one way we can continue to improve our services. Please take a few minutes to complete the written survey that you may receive in the mail after your visit with us. Thank you!             Your Updated Medication List - Protect others around you: Learn how to safely use, store and throw away your medicines at www.disposemymeds.org.          This list is accurate as of: 11/1/17  4:34 PM.  Always use your most recent med list.                   Brand Name Dispense Instructions for use Diagnosis    hydrochlorothiazide 25 MG tablet    HYDRODIURIL    90 tablet    Take 1 tablet (25 mg) by mouth daily    Essential hypertension       metoprolol 200 MG 24 hr tablet    TOPROL-XL    90 tablet    Take 1 tablet (200 mg) by mouth daily    Essential hypertension       omeprazole 40 MG capsule    priLOSEC    90 capsule    Take 1 capsule (40 mg) by mouth daily Take 30-60 minutes before a meal.    Chronic cough

## 2017-11-01 NOTE — PROGRESS NOTES
SUBJECTIVE:   Mechelle Cee is a 57 year old female who presents to clinic today for the following health issues:  Chief Complaint   Patient presents with     Pharyngitis     x 1 day, no fever associated      Panel Management     mammogram order placed 5/5/17, gave patient reminder     ENT Symptoms             Symptoms: cc Present Absent Comment   Fever/Chills  x  Chills, no fever   Fatigue  x  Did not get a good night sleep    Muscle Aches   x    Eye Irritation   x    Sneezing  x     Nasal Marquise/Drg  x  For a couple of weeks    Sinus Pressure/Pain   x    Loss of smell   x    Dental pain   x    Sore Throat x x  Painful to swallow but can still eat   Swollen Glands   x    Ear Pain/Fullness   x    Cough   x    Wheeze   x    Chest Pain   x    Shortness of breath   x    Rash   x    Other         Symptom duration:  started last night    Symptom severity:     Treatments tried:  gargle w/ salt water, Advil    Contacts:  son w/ similar symptoms x 3 days         Problem list and histories reviewed & adjusted, as indicated.  Additional history: as documented    Patient Active Problem List   Diagnosis     HYPERLIPIDEMIA LDL GOAL <160     Overweight (BMI 25.0-29.9)     Essential hypertension     Syncope, unspecified syncope type     Chronic cough     Past Surgical History:   Procedure Laterality Date     SURGICAL HISTORY OF -   1997    tubal ligation     SURGICAL HISTORY OF -   1987,1988,1997    Normal Spontaneous Vaginal Deliveries       Social History   Substance Use Topics     Smoking status: Never Smoker     Smokeless tobacco: Never Used     Alcohol use Yes      Comment: beer or wine 3-4 a week     Family History   Problem Relation Age of Onset     Hypertension Mother      Lipids Mother      HEART DISEASE Father      Hypertension Father      Blood Disease Sister      leukemia     Breast Cancer No family hx of      Cancer - colorectal No family hx of          Current Outpatient Prescriptions   Medication Sig Dispense  "Refill     metoprolol (TOPROL-XL) 200 MG 24 hr tablet Take 1 tablet (200 mg) by mouth daily 90 tablet 3     hydrochlorothiazide (HYDRODIURIL) 25 MG tablet Take 1 tablet (25 mg) by mouth daily 90 tablet 3     omeprazole (PRILOSEC) 40 MG capsule Take 1 capsule (40 mg) by mouth daily Take 30-60 minutes before a meal. 90 capsule 3     Allergies   Allergen Reactions     Amlodipine Swelling     Leg swelling     Tessalon Perles [Benzonatate-Fd&C Yellow #10]          Reviewed and updated as needed this visit by clinical staffTobacco  Allergies  Med Hx  Surg Hx  Fam Hx  Soc Hx      Reviewed and updated as needed this visit by Provider         ROS:  Constitutional, HEENT, pulmonary systems are negative, except as otherwise noted.      OBJECTIVE:   /84 (BP Location: Left arm, Patient Position: Chair, Cuff Size: Adult Regular)  Pulse 88  Temp 98.4  F (36.9  C) (Tympanic)  Ht 5' 0.25\" (1.53 m)  Wt 168 lb 6.4 oz (76.4 kg)  SpO2 98%  BMI 32.62 kg/m2  Body mass index is 32.62 kg/(m^2).    GENERAL: alert and no distress  EYES: Eyes grossly normal to inspection, PERRL and conjunctivae and sclerae normal  HENT: ear canals and TMs normal, sinuses non tender to percussion, nose and mouth without ulcers or lesions, oropharynx red and white exudate vs stone present on right tonsil  NECK: no adenopathy, no asymmetry, masses, or scars  RESP: lungs clear to auscultation - no rales, rhonchi or wheezes  CV: regular rate and rhythm, normal S1 S2, no S3 or S4, no murmur, click or rub      Diagnostic Test Results:  Results for orders placed or performed in visit on 11/01/17 (from the past 24 hour(s))   Strep, Rapid Screen   Result Value Ref Range    Specimen Description Throat     Rapid Strep A Screen       NEGATIVE: No Group A streptococcal antigen detected by immunoassay, await culture report.       ASSESSMENT/PLAN:       1. Viral pharyngitis    Although Mechelle does have a white exudate present on the right tonsil, she does " report a history of a tonsil stone in that location, so this white spot may be the same.  She does not have any fever or lymphadenopathy and rapid strep is negative, so viral etiology seems more likely than strep.  Will follow-up on strep culture however.  In the meantime, continue home cares and follow-up with worsening/new symptoms.      - Strep, Rapid Screen    Caio Faustin MD  [facility]

## 2017-11-02 LAB
BACTERIA SPEC CULT: NORMAL
SPECIMEN SOURCE: NORMAL

## 2017-11-15 ENCOUNTER — OFFICE VISIT (OUTPATIENT)
Dept: FAMILY MEDICINE | Facility: CLINIC | Age: 57
End: 2017-11-15
Payer: COMMERCIAL

## 2017-11-15 VITALS
SYSTOLIC BLOOD PRESSURE: 155 MMHG | DIASTOLIC BLOOD PRESSURE: 93 MMHG | WEIGHT: 167.2 LBS | TEMPERATURE: 98.5 F | BODY MASS INDEX: 32.83 KG/M2 | HEIGHT: 60 IN | HEART RATE: 75 BPM

## 2017-11-15 DIAGNOSIS — H83.09 LABYRINTHITIS, UNSPECIFIED LATERALITY: Primary | ICD-10-CM

## 2017-11-15 PROCEDURE — 99213 OFFICE O/P EST LOW 20 MIN: CPT | Performed by: FAMILY MEDICINE

## 2017-11-15 NOTE — PATIENT INSTRUCTIONS
(H83.09) Labyrinthitis, unspecified laterality  (primary encounter diagnosis)  Comment:   Plan: we discussed the cause and the viral infection has inflamed the inner ear and balance, called the Labrynth.   Avoid ladders and swimming and heights. Be careful driving. Move the head slowly. Antivert at 25 mg per dose, as needed  Every 8 hours for vertigo may be used. This will last a few days and follow up as needed.

## 2017-11-15 NOTE — PROGRESS NOTES
SUBJECTIVE:   Mechelle Cee is a 57 year old female who presents to clinic today for the following health issues:  Chief Complaint   Patient presents with     Dizziness     Pt here for dizziness today.     Pt was seen on 11/1/17 for st, strep test negative.  Dizziness  Onset: has been sick since Halloween, dizziness with vertigo on and off with illness;   She had a negative strep test 10-12 days ago. No lightheadedness.   .    Description:   Do you feel faint:  YES  Does it feel like the surroundings (bed, room) are moving: YES  Unsteady/off balance: YES  Have you passed out or fallen: no     Intensity: moderate, severe    Progression of Symptoms:  worsening, intermittent and waxing and waning    Accompanying Signs & Symptoms:    Pt also has cough, drainage down throat, hoarseness in voice  Heart palpitations: no   Nausea, vomiting: YES- nausea today  Weakness in arms or legs: no   Fatigue: YES- with illness  Vision or speech changes: YES- vision blurry  Ringing in ears (Tinnitus): no   Hearing Loss: no     History:   Head trauma/concussion hx: no   Previous similar symptoms: YES- has had in the past but attributed to meds  Recent bleeding history: no     Precipitating factors:   Worse with activity or head movement: YES  Any new medications (BP?): YES- metoprolol, started 1 month ago due to atenolol shortage  Alcohol/drug abuse/withdrawal: no     Alleviating factors:   Does staying in a fixed position give relief:  no     Therapies Tried and outcome: none      Current Outpatient Prescriptions:      metoprolol (TOPROL-XL) 200 MG 24 hr tablet, Take 1 tablet (200 mg) by mouth daily, Disp: 90 tablet, Rfl: 3     omeprazole (PRILOSEC) 40 MG capsule, Take 1 capsule (40 mg) by mouth daily Take 30-60 minutes before a meal., Disp: 90 capsule, Rfl: 3     hydrochlorothiazide (HYDRODIURIL) 25 MG tablet, Take 1 tablet (25 mg) by mouth daily, Disp: 90 tablet, Rfl: 3    Patient Active Problem List   Diagnosis      "HYPERLIPIDEMIA LDL GOAL <160     Overweight (BMI 25.0-29.9)     Essential hypertension     Syncope, unspecified syncope type     Chronic cough       Blood pressure 151/89, pulse 75, temperature 98.5  F (36.9  C), temperature source Tympanic, height 5' 0.25\" (1.53 m), weight 167 lb 3.2 oz (75.8 kg).    Exam:  GENERAL APPEARANCE: healthy, alert and no distress  EYES: EOMI,  PERRL  HENT: ear canals and TM's normal and nose and mouth without ulcers or lesions  NECK: no adenopathy, no asymmetry, masses, or scars and thyroid normal to palpation  RESP: lungs clear to auscultation - no rales, rhonchi or wheezes  CV: regular rates and rhythm, normal S1 S2, no S3 or S4 and no murmur, click or rub -  PSYCH: mentation appears normal and affect normal/bright      (H83.09) Labyrinthitis, unspecified laterality  (primary encounter diagnosis)  Comment:   Plan: we discussed the cause and the viral infection has inflamed the inner ear and balance, called the Labrynth.   Avoid ladders and swimming and heights. Be careful driving. Move the head slowly. Antivert at 25 mg per dose, as needed  Every 8 hours for vertigo may be used. This will last a few days and follow up as needed.     Daryl Cedeno          "

## 2017-11-15 NOTE — MR AVS SNAPSHOT
After Visit Summary   11/15/2017    Mechelle Cee    MRN: [id number]           Patient Information     Date Of Birth          1960        Visit Information        Provider Department      11/15/2017 7:40 AM Daryl Cedeno MD [facility]        Today's Diagnoses     Labyrinthitis, unspecified laterality    -  1      Care Instructions    (H83.09) Labyrinthitis, unspecified laterality  (primary encounter diagnosis)  Comment:   Plan: we discussed the cause and the viral infection has inflamed the inner ear and balance, called the Labrynth.   Avoid ladders and swimming and heights. Be careful driving. Move the head slowly. Antivert at 25 mg per dose, as needed  Every 8 hours for vertigo may be used. This will last a few days and follow up as needed.           Follow-ups after your visit        Who to contact     If you have questions or need follow up information about today's clinic visit or your schedule please contact [facility] directly at [phone].  Normal or non-critical lab and imaging results will be communicated to you by [employer], letter or phone within 4 business days after the clinic has received the results. If you do not hear from us within 7 days, please contact the clinic through [employer] or phone. If you have a critical or abnormal lab result, we will notify you by phone as soon as possible.  Submit refill requests through [employer] or call your pharmacy and they will forward the refill request to us. Please allow 3 business days for your refill to be completed.          Additional Information About Your Visit        Page[employer]amphart Information     [employer] gives you secure access to your electronic health record. If you see a primary care provider, you can also send messages to your care team and make appointments. If you have questions, please call your primary care clinic.  If you do not have a primary care provider, please call [phone] and they will  "assist you.        Care EveryWhere ID     This is your Care EveryWhere ID. This could be used by other organizations to access your [facility] medical records  [id number]        Your Vitals Were     Pulse Temperature Height BMI (Body Mass Index)          75 98.5  F (36.9  C) (Tympanic) 5' 0.25\" (1.53 m) 32.38 kg/m2         Blood Pressure from Last 3 Encounters:   11/15/17 151/89   11/01/17 164/84   05/19/17 139/84    Weight from Last 3 Encounters:   11/15/17 167 lb 3.2 oz (75.8 kg)   11/01/17 168 lb 6.4 oz (76.4 kg)   05/05/17 169 lb (76.7 kg)              Today, you had the following     No orders found for display       Primary Care Provider Office Phone # Fax #    Margarita Diop DO [facility] [phone] [address] [address]        Equal Access to Services     JOYCELYN BOYKIN : Hadii aad ku hadasho Soomaali, waaxda luqadaha, qaybta kaalmada adeegyada, waxay idiin hayelvern chacha tsang . So [facility] [phone].    ATENCIÓN: Si habla español, tiene a singh disposición servicios gratuitos de asistencia lingüística. Llame al [phone].    We comply with applicable federal civil rights laws and [place] laws. We do not discriminate on the basis of race, color, national origin, age, disability, sex, sexual orientation, or gender identity.            Thank you!     Thank you for choosing [facility]  for your care. Our goal is always to provide you with excellent care. Hearing back from our patients is one way we can continue to improve our services. Please take a few minutes to complete the written survey that you may receive in the mail after your visit with us. Thank you!             Your Updated Medication List - Protect others around you: Learn how to safely use, store and throw away your medicines at www.disposemymeds.org.          This list is accurate as of: 11/15/17  8:23 AM.  Always use your most recent med list.                   Brand Name Dispense Instructions " for use Diagnosis    hydrochlorothiazide 25 MG tablet    HYDRODIURIL    90 tablet    Take 1 tablet (25 mg) by mouth daily    Essential hypertension       metoprolol 200 MG 24 hr tablet    TOPROL-XL    90 tablet    Take 1 tablet (200 mg) by mouth daily    Essential hypertension       omeprazole 40 MG capsule    priLOSEC    90 capsule    Take 1 capsule (40 mg) by mouth daily Take 30-60 minutes before a meal.    Chronic cough

## 2017-11-15 NOTE — NURSING NOTE
"Chief Complaint   Patient presents with     Dizziness     Pt here for dizziness today.       Initial BP (!) 151/94  Pulse 80  Temp 98.5  F (36.9  C) (Tympanic)  Ht 5' 0.25\" (1.53 m)  Wt 167 lb 3.2 oz (75.8 kg)  BMI 32.38 kg/m2 Estimated body mass index is 32.38 kg/(m^2) as calculated from the following:    Height as of this encounter: 5' 0.25\" (1.53 m).    Weight as of this encounter: 167 lb 3.2 oz (75.8 kg).  Medication Reconciliation: complete  Kacy Thompson CMA    "

## 2017-12-22 ENCOUNTER — TELEPHONE (OUTPATIENT)
Dept: FAMILY MEDICINE | Facility: CLINIC | Age: 57
End: 2017-12-22

## 2017-12-22 NOTE — TELEPHONE ENCOUNTER
We sent this patient an overdue lab letter on 11/11/17. If they need these results please contact the patient.  Thank you  [facility]

## 2018-01-24 ENCOUNTER — TELEPHONE (OUTPATIENT)
Dept: FAMILY MEDICINE | Facility: CLINIC | Age: 58
End: 2018-01-24

## 2018-01-24 NOTE — TELEPHONE ENCOUNTER
Panel Management Review      Patient has the following on her problem list:     Hypertension   Last three blood pressure readings:  BP Readings from Last 3 Encounters:   11/15/17 (!) 155/93   11/01/17 164/84   05/19/17 139/84     Blood pressure: FAILED    HTN Guidelines:  Age 18-59 BP range:  Less than 140/90  Age 60-85 with Diabetes:  Less than 140/90  Age 60-85 without Diabetes:  less than 150/90      Composite cancer screening  Chart review shows that this patient is due/due soon for the following Mammogram  Summary:    Patient is due/failing the following:   BP CHECK and MAMMOGRAM    Action needed:   Patient needs office visit for Mammogram. and Patient needs nurse only appointment.    Type of outreach:    Sent letter.    Questions for provider review:    None                                                                                                                                    Yesy Bledsoe CMA (AAMA)   (aka: Rosi Bledsoe)

## 2018-01-24 NOTE — LETTER
January 24, 2018      Mechelle Cee  [address] [address]          Dear Mechelle Cee, [id number]    At [facility] we care about your health and are committed to providing quality patient care, which includes staying current on preventative screenings.  You can increase your chances of finding and treating diseases through regular screenings.      Our records show that you are due for the following screening(s):    Mammogram  Blood Pressure Recheck with RN (Free Visit) 15 minutes      Mammogram for breast cancer -  [phone] please call to schedule  Recommended every 1-2 years for women age 50 and older  Mammograms help detect breast cancer, which is the most common cancer among women in the [place].  You may need to start having mammograms earlier and more often if you have had breast cancer, breast problems, or a family history of breast cancer.     Blood Pressure Recheck with RN - [phone] - Please call to schedule  Your most recent blood pressure reading preformed at our clinic was higher than we like to see it. The goal is to have it under 140/90 in clinic.    Be sure to take all of your blood pressure medications, and avoid stimulants like caffeine, cold medicines, sudafed, or tobacco prior to your recheck.    If your blood pressure medication was changed by your provider recently wait 2 weeks before making this recheck appointment.     Thank you for trusting us with your health care.    Sincerely,    Your [facility] Health Care Team

## 2018-06-11 ENCOUNTER — TELEPHONE (OUTPATIENT)
Dept: FAMILY MEDICINE | Facility: CLINIC | Age: 58
End: 2018-06-11

## 2018-06-12 NOTE — TELEPHONE ENCOUNTER
Per outreach, patient is aware of overdue mammogram screening and will call on their own time for scheduling.     Thanks

## 2018-07-17 DIAGNOSIS — I10 ESSENTIAL HYPERTENSION: ICD-10-CM

## 2018-07-18 RX ORDER — HYDROCHLOROTHIAZIDE 25 MG/1
TABLET ORAL
Qty: 90 TABLET | Refills: 0 | Status: SHIPPED | OUTPATIENT
Start: 2018-07-18 | End: 2018-11-04

## 2018-07-18 NOTE — TELEPHONE ENCOUNTER
"Requested Prescriptions   Pending Prescriptions Disp Refills     hydrochlorothiazide (HYDRODIURIL) 25 MG tablet [Pharmacy Med Name: HYDROCHLOROT 25MG   TAB]  Last Written Prescription Date:  05/05/2017  Last Fill Quantity: 90,  # refills: 3   Last office visit: 11/15/2017 with prescribing provider:  van   Future Office Visit:     90 tablet 3     Sig: TAKE ONE TABLET BY MOUTH ONCE DAILY    Diuretics (Including Combos) Protocol Failed    7/17/2018  7:26 PM       Failed - Blood pressure under 140/90 in past 12 months    BP Readings from Last 3 Encounters:   11/15/17 (!) 155/93   11/01/17 164/84   05/19/17 139/84                Failed - Normal serum creatinine on file in past 12 months    Recent Labs   Lab Test  05/05/17   1020   CR  0.78             Failed - Normal serum potassium on file in past 12 months    Recent Labs   Lab Test  05/05/17   1020   POTASSIUM  3.7                   Failed - Normal serum sodium on file in past 12 months    Recent Labs   Lab Test  05/05/17   1020   NA  138             Passed - Recent (12 mo) or future (30 days) visit within the authorizing provider's specialty    Patient had office visit in the last 12 months or has a visit in the next 30 days with authorizing provider or within the authorizing provider's specialty.  See \"Patient Info\" tab in inbasket, or \"Choose Columns\" in Meds & Orders section of the refill encounter.           Passed - Patient is age 18 or older       Passed - No active pregancy on record       Passed - No positive pregnancy test in past 12 months        Kan Sierra RT (R)    "

## 2018-07-18 NOTE — TELEPHONE ENCOUNTER
Routing refill request to provider for review/approval because:  Blood pressure not in range.    June Dudley RN

## 2018-10-08 ENCOUNTER — TELEPHONE (OUTPATIENT)
Dept: FAMILY MEDICINE | Facility: CLINIC | Age: 58
End: 2018-10-08

## 2018-10-08 DIAGNOSIS — I10 ESSENTIAL HYPERTENSION: ICD-10-CM

## 2018-10-08 RX ORDER — METOPROLOL SUCCINATE 200 MG/1
TABLET, EXTENDED RELEASE ORAL
Qty: 30 TABLET | Refills: 0 | Status: SHIPPED | OUTPATIENT
Start: 2018-10-08 | End: 2018-11-09 | Stop reason: ALTCHOICE

## 2018-10-08 NOTE — TELEPHONE ENCOUNTER
"Requested Prescriptions   Pending Prescriptions Disp Refills     metoprolol succinate (TOPROL-XL) 200 MG 24 hr tablet [Pharmacy Med Name: METOPROLOL [id number]  TAB] 30 tablet 11     Sig: TAKE ONE TABLET BY MOUTH ONCE DAILY    Beta-Blockers Protocol Failed    10/8/2018  5:30 AM       Failed - Blood pressure under 140/90 in past 12 months    BP Readings from Last 3 Encounters:   11/15/17 (!) 155/93   11/01/17 164/84   05/19/17 139/84                Passed - Patient is age 6 or older       Passed - Recent (12 mo) or future (30 days) visit within the authorizing provider's specialty    Patient had office visit in the last 12 months or has a visit in the next 30 days with authorizing provider or within the authorizing provider's specialty.  See \"Patient Info\" tab in inbasket, or \"Choose Columns\" in Meds & Orders section of the refill encounter.            Last Written Prescription Date:  9/19/17  Last Fill Quantity: 90,  # refills: 3   Last office visit: 11/15/2017 with prescribing provider:  Pao   Future Office Visit:      Routing refill request to provider for review/approval because:  Labs out of range:  BP        "

## 2018-10-09 NOTE — TELEPHONE ENCOUNTER
I left a message for the patient to return my call.  She will be due for annual Office Visit in November, she is also due for labs.  Goldie TIM RN

## 2018-11-04 ENCOUNTER — TELEPHONE (OUTPATIENT)
Dept: FAMILY MEDICINE | Facility: CLINIC | Age: 58
End: 2018-11-04

## 2018-11-04 DIAGNOSIS — I10 ESSENTIAL HYPERTENSION: ICD-10-CM

## 2018-11-05 NOTE — TELEPHONE ENCOUNTER
"Requested Prescriptions   Pending Prescriptions Disp Refills     hydrochlorothiazide (HYDRODIURIL) 25 MG tablet [Pharmacy Med Name: HYDROCHLOROT 25MG   TAB] 90 tablet 0     Sig: TAKE 1 TABLET BY MOUTH ONCE DAILY    Diuretics (Including Combos) Protocol Failed    11/4/2018 11:05 AM       Failed - Blood pressure under 140/90 in past 12 months    BP Readings from Last 3 Encounters:   11/15/17 (!) 155/93   11/01/17 164/84   05/19/17 139/84                Failed - Normal serum creatinine on file in past 12 months    Recent Labs   Lab Test  05/05/17   1020   CR  0.78             Failed - Normal serum potassium on file in past 12 months    Recent Labs   Lab Test  05/05/17   1020   POTASSIUM  3.7                   Failed - Normal serum sodium on file in past 12 months    Recent Labs   Lab Test  05/05/17   1020   NA  138             Passed - Recent (12 mo) or future (30 days) visit within the authorizing provider's specialty    Patient had office visit in the last 12 months or has a visit in the next 30 days with authorizing provider or within the authorizing provider's specialty.  See \"Patient Info\" tab in inbasket, or \"Choose Columns\" in Meds & Orders section of the refill encounter.             Passed - Patient is age 18 or older       Passed - No active pregancy on record       Passed - No positive pregnancy test in past 12 months          "

## 2018-11-05 NOTE — TELEPHONE ENCOUNTER
Left message for patient to return call to clinic.  Patient has OV 11-9-18, does she have enough meds to get to appt?  Marely FIELD RN

## 2018-11-06 RX ORDER — HYDROCHLOROTHIAZIDE 25 MG/1
TABLET ORAL
Qty: 30 TABLET | Refills: 0 | Status: SHIPPED | OUTPATIENT
Start: 2018-11-06 | End: 2018-11-09 | Stop reason: ALTCHOICE

## 2018-11-06 NOTE — TELEPHONE ENCOUNTER
Medication is being filled for 1 time refill only due to:  Patient needs to be seen because has upcoming appt.

## 2018-11-09 ENCOUNTER — OFFICE VISIT (OUTPATIENT)
Dept: FAMILY MEDICINE | Facility: CLINIC | Age: 58
End: 2018-11-09
Payer: COMMERCIAL

## 2018-11-09 VITALS
BODY MASS INDEX: 32.28 KG/M2 | SYSTOLIC BLOOD PRESSURE: 142 MMHG | WEIGHT: 171 LBS | HEART RATE: 73 BPM | RESPIRATION RATE: 14 BRPM | DIASTOLIC BLOOD PRESSURE: 86 MMHG | OXYGEN SATURATION: 98 % | HEIGHT: 61 IN | TEMPERATURE: 97.3 F

## 2018-11-09 DIAGNOSIS — I10 ESSENTIAL HYPERTENSION: ICD-10-CM

## 2018-11-09 DIAGNOSIS — Z12.31 VISIT FOR SCREENING MAMMOGRAM: ICD-10-CM

## 2018-11-09 DIAGNOSIS — Z82.49 FAMILY HISTORY OF CORONARY ARTERY DISEASE IN FATHER: ICD-10-CM

## 2018-11-09 DIAGNOSIS — Z00.00 ENCOUNTER FOR GENERAL ADULT MEDICAL EXAMINATION WITHOUT ABNORMAL FINDINGS: Primary | ICD-10-CM

## 2018-11-09 DIAGNOSIS — T44.7X5A ADVERSE EFFECT OF BETA-BLOCKER, INITIAL ENCOUNTER: ICD-10-CM

## 2018-11-09 DIAGNOSIS — E78.5 HYPERLIPIDEMIA LDL GOAL <130: ICD-10-CM

## 2018-11-09 DIAGNOSIS — Z12.11 SCREEN FOR COLON CANCER: ICD-10-CM

## 2018-11-09 LAB
ANION GAP SERPL CALCULATED.3IONS-SCNC: 5 MMOL/L (ref 3–14)
BUN SERPL-MCNC: 11 MG/DL (ref 7–30)
CALCIUM SERPL-MCNC: 8.8 MG/DL (ref 8.5–10.1)
CHLORIDE SERPL-SCNC: 100 MMOL/L (ref 94–109)
CHOLEST SERPL-MCNC: 271 MG/DL
CO2 SERPL-SCNC: 32 MMOL/L (ref 20–32)
CREAT SERPL-MCNC: 0.8 MG/DL (ref 0.52–1.04)
GFR SERPL CREATININE-BSD FRML MDRD: 74 ML/MIN/1.7M2
GLUCOSE SERPL-MCNC: 99 MG/DL (ref 70–99)
HDLC SERPL-MCNC: 45 MG/DL
LDLC SERPL CALC-MCNC: 182 MG/DL
NONHDLC SERPL-MCNC: 226 MG/DL
POTASSIUM SERPL-SCNC: 3.6 MMOL/L (ref 3.4–5.3)
SODIUM SERPL-SCNC: 137 MMOL/L (ref 133–144)
TRIGL SERPL-MCNC: 222 MG/DL

## 2018-11-09 PROCEDURE — 80048 BASIC METABOLIC PNL TOTAL CA: CPT | Performed by: INTERNAL MEDICINE

## 2018-11-09 PROCEDURE — 36415 COLL VENOUS BLD VENIPUNCTURE: CPT | Performed by: INTERNAL MEDICINE

## 2018-11-09 PROCEDURE — 80061 LIPID PANEL: CPT | Performed by: INTERNAL MEDICINE

## 2018-11-09 PROCEDURE — 99396 PREV VISIT EST AGE 40-64: CPT | Performed by: INTERNAL MEDICINE

## 2018-11-09 PROCEDURE — 99214 OFFICE O/P EST MOD 30 MIN: CPT | Mod: 25 | Performed by: INTERNAL MEDICINE

## 2018-11-09 RX ORDER — METOPROLOL SUCCINATE 200 MG/1
200 TABLET, EXTENDED RELEASE ORAL DAILY
Qty: 30 TABLET | Refills: 0 | Status: CANCELLED | OUTPATIENT
Start: 2018-11-09

## 2018-11-09 RX ORDER — LOSARTAN POTASSIUM AND HYDROCHLOROTHIAZIDE 25; 100 MG/1; MG/1
1 TABLET ORAL DAILY
Qty: 90 TABLET | Refills: 3 | Status: SHIPPED | OUTPATIENT
Start: 2018-11-09

## 2018-11-09 RX ORDER — HYDROCHLOROTHIAZIDE 25 MG/1
25 TABLET ORAL DAILY
Qty: 30 TABLET | Refills: 0 | Status: CANCELLED | OUTPATIENT
Start: 2018-11-09

## 2018-11-09 NOTE — PATIENT INSTRUCTIONS
1. Stop current blood pressure medications  2. Start combo pill losartan/HCTZ once daily in AM  3. Blood work today and in 2 weeks.  4. Order placed for Coronary Calcium Score test.  Radiology test was ordered.  Please call [phone] to schedule.  5. You are due for a mammogram.  Please call [phone] to schedule.  5. Return stool test        Preventive Health Recommendations  Female Ages 50 - 64    Yearly exam: See your health care provider every year in order to  o Review health changes.   o Discuss preventive care.    o Review your medicines if your doctor has prescribed any.      Get a Pap test every three years (unless you have an abnormal result and your provider advises testing more often).    If you get Pap tests with HPV test, you only need to test every 5 years, unless you have an abnormal result.     You do not need a Pap test if your uterus was removed (hysterectomy) and you have not had cancer.    You should be tested each year for STDs (sexually transmitted diseases) if you're at risk.     Have a mammogram every 1 to 2 years.    Have a colonoscopy at age 50, or have a yearly FIT test (stool test). These exams screen for colon cancer.      Have a cholesterol test every 5 years, or more often if advised.    Have a diabetes test (fasting glucose) every three years. If you are at risk for diabetes, you should have this test more often.     If you are at risk for osteoporosis (brittle bone disease), think about having a bone density scan (DEXA).    Shots: Get a flu shot each year. Get a tetanus shot every 10 years.    Nutrition:     Eat at least 5 servings of fruits and vegetables each day.    Eat whole-grain bread, whole-wheat pasta and brown rice instead of white grains and rice.    Get adequate Calcium and Vitamin D.     Lifestyle    Exercise at least 150 minutes a week (30 minutes a day, 5 days a week). This will help you control your weight and prevent disease.    Limit alcohol to one drink per  day.    No smoking.     Wear sunscreen to prevent skin cancer.     See your dentist every six months for an exam and cleaning.    See your eye doctor every 1 to 2 years.      For weight loss:  --Exercise  for 30-60 minutes most days of the week.  --Make sure the exercise is getting your heart rate up.  For example, if you weigh 150 lbs, walking the dog at 3 mph (moderate pace) for 30 minutes only burns 110 calories!  --To lose 1 lb per week, you need to have a calorie deficit of at least 500 calories per day  --Snacks in between meals should be fruits and vegetables.  --No eating after dinner.  --Avoid sodas and energy drinks.  --Avoid alcohol.  --Avoid fast food and fried foods.  --Increase whole grains in diet.  --Increase fiber to 25 grams daily.  --Free diet website:  Www.[employer]imtek    Weight Loss Apps:  1. Lose It!  2. My Fitness Pal - connects to Fit Bit  3. Fooducate - can scan bar codes of foods with your phone  4. [employer] - exercise videos for all skill levels  5. 7 Minute Workout Stephenie    Estimate Calories Burned:  1. Http://www.[email or web address]/calculate/cbc  2. Http://www.[employer]MeNetwork.com/exercise/lookup

## 2018-11-09 NOTE — PROGRESS NOTES
SUBJECTIVE:   CC: Mechelle Cee is an 58 year old woman who presents for preventive health visit.     Healthy Habits:    Do you get at least three servings of calcium containing foods daily (dairy, green leafy vegetables, etc.)? yes    Amount of exercise or daily activities, outside of work: 3 day(s) per week    Problems taking medications regularly No    Medication side effects: Yes metoprolol - dizziness.     Have you had an eye exam in the past two years? no    Do you see a dentist twice per year? yes    Do you have sleep apnea, excessive snoring or daytime drowsiness?yes      Refills:   Blood Work: LDL    Hyperlipidemia:  Father  at 60 of MI.  Mother with hypertension.  She is hesitant to take a statin - worried about side effects.  She would take it if she really needed it.  She wonders what her specific cardiovascular risk is.    Hypertension:  Had cough on lisinopril.  Leg swelling on amlodipine.  Blood pressure higher today than at home.  120s at home every day the last week at home.  Thinks having dizziness on metoprolol.      Today's PHQ-2 Score: 0  PHQ-2 (  [employer]) 2018   Q1: Little interest or pleasure in doing things 0 0   Q2: Feeling down, depressed or hopeless 0 0   PHQ-2 Score 0 0   Q1: Little interest or pleasure in doing things - -   Q2: Feeling down, depressed or hopeless - -   PHQ-2 Score - -       Abuse: Current or Past(Physical, Sexual or Emotional)- No  Do you feel safe in your environment - Yes    Social History   Substance Use Topics     Smoking status: Never Smoker     Smokeless tobacco: Never Used     Alcohol use Yes      Comment: 4x/week     If you drink alcohol do you typically have >3 drinks per day or >7 drinks per week? No                     Reviewed orders with patient.  Reviewed health maintenance and updated orders accordingly - Yes  Current Outpatient Prescriptions   Medication Sig Dispense Refill     hydrochlorothiazide (HYDRODIURIL) 25 MG tablet TAKE  "1 TABLET BY MOUTH ONCE DAILY 30 tablet 0     metoprolol succinate (TOPROL-XL) 200 MG 24 hr tablet TAKE ONE TABLET BY MOUTH ONCE DAILY 30 tablet 0     Multiple Vitamins-Minerals (MULTIVITAMIN ADULT PO)          Patient over age 50, mutual decision to screen reflected in health maintenance.    Pertinent mammograms are reviewed under the imaging tab.  History of abnormal Pap smear: NO - age 30- 65 PAP every 3 years recommended  PAP / HPV Latest Ref Rng & Units 5/5/2017   PAP - NIL   HPV 16 DNA NEG Negative   HPV 18 DNA NEG Negative   OTHER HR HPV NEG Negative     Reviewed and updated as needed this visit by clinical staff  Tobacco  Allergies  Meds  Problems  Med Hx  Surg Hx  Fam Hx  Soc Hx          Reviewed and updated as needed this visit by Provider  Allergies  Meds  Problems          ROS:  CONSTITUTIONAL: NEGATIVE for fever, chills, change in weight  INTEGUMENTARY/SKIN: NEGATIVE for worrisome rashes, moles or lesions  EYES: NEGATIVE for vision changes or irritation  ENT: NEGATIVE for ear, mouth and throat problems  RESP: NEGATIVE for significant cough or SOB  BREAST: NEGATIVE for masses, tenderness or discharge  CV: NEGATIVE for chest pain, palpitations or peripheral edema  GI: NEGATIVE for nausea, abdominal pain, heartburn, or change in bowel habits  : NEGATIVE for unusual urinary or vaginal symptoms. No vaginal bleeding.  MUSCULOSKELETAL: NEGATIVE for significant arthralgias or myalgia  NEURO: NEGATIVE for weakness, dizziness or paresthesias  PSYCHIATRIC: NEGATIVE for changes in mood or affect     OBJECTIVE:   /86  Pulse 73  Temp 97.3  F (36.3  C) (Tympanic)  Resp 14  Ht 5' 0.63\" (1.54 m)  Wt 171 lb (77.6 kg)  LMP 10/09/2017  SpO2 98%  Breastfeeding? No  BMI 32.71 kg/m2  EXAM:  GENERAL: healthy, alert and no distress  EYES: Eyes grossly normal to inspection, PERRL and conjunctivae and sclerae normal  HENT: ear canals and TM's normal, nose and mouth without ulcers or lesions  NECK: no " adenopathy, no asymmetry, masses, or scars and thyroid normal to palpation  RESP: lungs clear to auscultation - no rales, rhonchi or wheezes  CV: regular rate and rhythm, normal S1 S2, no S3 or S4, no murmur, click or rub, no peripheral edema and peripheral pulses strong  ABDOMEN: soft, nontender, no hepatosplenomegaly, no masses and bowel sounds normal  MS: no gross musculoskeletal defects noted, no edema  SKIN: no suspicious lesions or rashes  NEURO: Normal strength and tone, mentation intact and speech normal  PSYCH: mentation appears normal, affect normal/bright    Diagnostic Test Results:  none     ASSESSMENT/PLAN:   1. Encounter for general adult medical examination without abnormal findings    2. Family history of coronary artery disease in father  -with her hyperlipidemia, family history of heart disease in her father, she is concerned about her risk for cardiovascular disease.  Based on her ACC/AHA 10 year risk factor calculator, her risk is 4.1%, putting her in the non-statin benefit category.  Patient is also statin hesitant.  She would be willing to pay for the scan out of pocket.  - CT Coronary Calcium Scan; Future    3. Hyperlipidemia LDL goal <130 -recheck labs today, and recalculate her risk  - Lipid Profile (Chol, Trig, HDL, LDL calc)  - CT Coronary Calcium Scan; Future    4. Screen for colon cancer -patient is due  - Fecal colorectal cancer screen FIT - Future (S+30); Future    5. Visit for screening mammogram -patient is due  - MA SCREENING DIGITAL BILAT - Future  (s+30); Future    6. Essential hypertension -potential side effects of metoprolol.  Home blood pressure is better controlled in clinic blood pressure, question white coat hypertension.  Stop metoprolol, start losartan.  BMP in 2 weeks.  - Basic metabolic panel  - losartan-hydrochlorothiazide (HYZAAR) 100-25 MG per tablet; Take 1 tablet by mouth daily  Dispense: 90 tablet; Refill: 3  - **Basic metabolic panel FUTURE 14d;  "Future    COUNSELING:   Reviewed preventive health counseling, as reflected in patient instructions    BP Readings from Last 1 Encounters:   11/09/18 150/86     Estimated body mass index is 32.71 kg/(m^2) as calculated from the following:    Height as of this encounter: 5' 0.63\" (1.54 m).    Weight as of this encounter: 171 lb (77.6 kg).      Weight management plan: Discussed healthy diet and exercise guidelines and patient will follow up in 12 months in clinic to re-evaluate.     reports that she has never smoked. She has never used smokeless tobacco.      Counseling Resources:  ATP IV Guidelines  Pooled Cohorts Equation Calculator  Breast Cancer Risk Calculator  FRAX Risk Assessment  ICSI Preventive Guidelines  Dietary Guidelines for Americans, 2010  USDA's MyPlate  ASA Prophylaxis  Lung CA Screening    Margarita Hope,   [facility]  "

## 2018-11-09 NOTE — MR AVS SNAPSHOT
After Visit Summary   11/9/2018    Mechelle Cee    MRN: [id number]           Patient Information     Date Of Birth          1960        Visit Information        Provider Department      11/9/2018 9:20 AM Margarita Hope,  [facility]        Today's Diagnoses     Encounter for general adult medical examination without abnormal findings    -  1    Screen for colon cancer        Visit for screening mammogram        Essential hypertension        Hyperlipidemia LDL goal <130        Family history of coronary artery disease in father          Care Instructions    1. Stop current blood pressure medications  2. Start combo pill losartan/HCTZ once daily in AM  3. Blood work today and in 2 weeks.  4. Order placed for Coronary Calcium Score test.  Radiology test was ordered.  Please call [phone] to schedule.  5. You are due for a mammogram.  Please call [phone] to schedule.  5. Return stool test        Preventive Health Recommendations  Female Ages 50 - 64    Yearly exam: See your health care provider every year in order to  o Review health changes.   o Discuss preventive care.    o Review your medicines if your doctor has prescribed any.      Get a Pap test every three years (unless you have an abnormal result and your provider advises testing more often).    If you get Pap tests with HPV test, you only need to test every 5 years, unless you have an abnormal result.     You do not need a Pap test if your uterus was removed (hysterectomy) and you have not had cancer.    You should be tested each year for STDs (sexually transmitted diseases) if you're at risk.     Have a mammogram every 1 to 2 years.    Have a colonoscopy at age 50, or have a yearly FIT test (stool test). These exams screen for colon cancer.      Have a cholesterol test every 5 years, or more often if advised.    Have a diabetes test (fasting glucose) every three years. If you are at risk for diabetes, you  should have this test more often.     If you are at risk for osteoporosis (brittle bone disease), think about having a bone density scan (DEXA).    Shots: Get a flu shot each year. Get a tetanus shot every 10 years.    Nutrition:     Eat at least 5 servings of fruits and vegetables each day.    Eat whole-grain bread, whole-wheat pasta and brown rice instead of white grains and rice.    Get adequate Calcium and Vitamin D.     Lifestyle    Exercise at least 150 minutes a week (30 minutes a day, 5 days a week). This will help you control your weight and prevent disease.    Limit alcohol to one drink per day.    No smoking.     Wear sunscreen to prevent skin cancer.     See your dentist every six months for an exam and cleaning.    See your eye doctor every 1 to 2 years.      For weight loss:  --Exercise  for 30-60 minutes most days of the week.  --Make sure the exercise is getting your heart rate up.  For example, if you weigh 150 lbs, walking the dog at 3 mph (moderate pace) for 30 minutes only burns 110 calories!  --To lose 1 lb per week, you need to have a calorie deficit of at least 500 calories per day  --Snacks in between meals should be fruits and vegetables.  --No eating after dinner.  --Avoid sodas and energy drinks.  --Avoid alcohol.  --Avoid fast food and fried foods.  --Increase whole grains in diet.  --Increase fiber to 25 grams daily.  --Free diet website:  Www.IncellDx    Weight Loss Apps:  1. Lose It!  2. My Fitness Pal - connects to Fit Bit  3. Fooducate - can scan bar codes of foods with your phone  4. [employer] - exercise videos for all skill levels  5. 7 Minute Workout Stephenie    Estimate Calories Burned:  1. Http://www.Wire.com/calculate/cbc  2. Http://www.[employer].com/exercise/lookup              Follow-ups after your visit        Follow-up notes from your care team     Return in about 1 year (around 11/9/2019) for Physical Exam.      Future tests that were ordered for you  "today     Open Future Orders        Priority Expected Expires Ordered    MA SCREENING DIGITAL BILAT - Future  (s+30) Routine  11/9/2019 11/9/2018    CT Coronary Calcium Scan Routine  11/9/2019 11/9/2018    **Basic metabolic panel FUTURE 14d Routine 11/16/2018 11/23/2018 11/9/2018    Fecal colorectal cancer screen FIT - Future (S+30) Routine 11/30/2018 12/9/2018 11/9/2018            Who to contact     If you have questions or need follow up information about today's clinic visit or your schedule please contact [facility] directly at [phone].  Normal or non-critical lab and imaging results will be communicated to you by DoubleU[employer]art, letter or phone within 4 business days after the clinic has received the results. If you do not hear from us within 7 days, please contact the clinic through [employer] or phone. If you have a critical or abnormal lab result, we will notify you by phone as soon as possible.  Submit refill requests through [employer] ([employer]) or call your pharmacy and they will forward the refill request to us. Please allow 3 business days for your refill to be completed.          Additional Information About Your Visit        [employer] Information     [employer] ([employer]) gives you secure access to your electronic health record. If you see a primary care provider, you can also send messages to your care team and make appointments. If you have questions, please call your primary care clinic.  If you do not have a primary care provider, please call [phone] and they will assist you.        Care EveryWhere ID     This is your Care EveryWhere ID. This could be used by other organizations to access your [place] medical records  [id number]        Your Vitals Were     Pulse Temperature Respirations Height Last Period Pulse Oximetry    73 97.3  F (36.3  C) (Tympanic) 14 5' 0.63\" (1.54 m) 10/09/2017 98%    Breastfeeding? BMI (Body Mass Index)                No 32.71 kg/m2           Blood Pressure from Last 3 Encounters: "   11/09/18 150/86   11/15/17 (!) 155/93   11/01/17 164/84    Weight from Last 3 Encounters:   11/09/18 171 lb (77.6 kg)   11/15/17 167 lb 3.2 oz (75.8 kg)   11/01/17 168 lb 6.4 oz (76.4 kg)              We Performed the Following     Basic metabolic panel     Lipid Profile (Chol, Trig, HDL, LDL calc)          Today's Medication Changes          These changes are accurate as of 11/9/18 10:04 AM.  If you have any questions, ask your nurse or doctor.               Start taking these medicines.        Dose/Directions    losartan-hydrochlorothiazide 100-25 MG per tablet   Commonly known as:  HYZAAR   Used for:  Essential hypertension   Started by:  Margarita Hope DO        Dose:  1 tablet   Take 1 tablet by mouth daily   Quantity:  90 tablet   Refills:  3         Stop taking these medicines if you haven't already. Please contact your care team if you have questions.     hydrochlorothiazide 25 MG tablet   Commonly known as:  HYDRODIURIL   Stopped by:  Margarita Hope DO           metoprolol succinate 200 MG 24 hr tablet   Commonly known as:  TOPROL-XL   Stopped by:  Margarita Hope DO           omeprazole 40 MG capsule   Commonly known as:  priLOSEC   Stopped by:  Margarita Hope DO                Where to get your medicines      These medications were sent to [facility] [address]  [address] [facility] [address]     Phone:  [phone]     losartan-hydrochlorothiazide 100-25 MG per tablet                Primary Care Provider Office Phone # Fax #    Margarita Hope [phone] [address] [facility] [address]        Equal Access to Services     JOYCELYN BOYKIN AH: Rome Anderson, antony quintero, antoni whitmore, yovany mercedes [facility] [phone].    ATENCIÓN: Si habla español, tiene a singh disposición servicios gratuitos de asistencia lingüística. Llame al  [phone].    We comply with applicable federal civil rights laws and [place] laws. We do not discriminate on the basis of race, color, national origin, age, disability, sex, sexual orientation, or gender identity.            Thank you!     Thank you for choosing [facility]  for your care. Our goal is always to provide you with excellent care. Hearing back from our patients is one way we can continue to improve our services. Please take a few minutes to complete the written survey that you may receive in the mail after your visit with us. Thank you!             Your Updated Medication List - Protect others around you: Learn how to safely use, store and throw away your medicines at www.disposemymeds.org.          This list is accurate as of 11/9/18 10:04 AM.  Always use your most recent med list.                   Brand Name Dispense Instructions for use Diagnosis    losartan-hydrochlorothiazide 100-25 MG per tablet    HYZAAR    90 tablet    Take 1 tablet by mouth daily    Essential hypertension       MULTIVITAMIN ADULT PO

## 2018-11-09 NOTE — LETTER
[facility]  [address] [facility] [address]  Phone: [phone]      11/9/2018     Mechelle Cee  [address] [address]      Dear Mechelle:    Thank you for allowing me to participate in your care. Your recent test results were reviewed and listed below.      Your results are provided below for your review    Cholesterol remains very elevated, similar to previous.  Will await the coronary calcium score before deciding on a statin.  Kidney function, blood sugar, electrolytes are normal.    Results for orders placed or performed in visit on 11/09/18   Basic metabolic panel   Result Value Ref Range    Sodium 137 133 - 144 mmol/L    Potassium 3.6 3.4 - 5.3 mmol/L    Chloride 100 94 - 109 mmol/L    Carbon Dioxide 32 20 - 32 mmol/L    Anion Gap 5 3 - 14 mmol/L    Glucose 99 70 - 99 mg/dL    Urea Nitrogen 11 7 - 30 mg/dL    Creatinine 0.80 0.52 - 1.04 mg/dL    GFR Estimate 74 >60 mL/min/1.7m2    GFR Estimate If Black 89 >60 mL/min/1.7m2    Calcium 8.8 8.5 - 10.1 mg/dL   Lipid Profile (Chol, Trig, HDL, LDL calc)   Result Value Ref Range    Cholesterol 271 (H) <200 mg/dL    Triglycerides 222 (H) <150 mg/dL    HDL Cholesterol 45 (L) >49 mg/dL    LDL Cholesterol Calculated 182 (H) <100 mg/dL    Non HDL Cholesterol 226 (H) <130 mg/dL       Thank you for choosing [facility]. As a result, please continue with the treatment plan discussed in the office. Return as discussed or sooner if symptoms worsen or fail to improve.     If you have any further questions or concerns, please do not hesitate to contact us.      Sincerely,      Margarita Hope DO/louise

## 2018-11-14 ENCOUNTER — TELEPHONE (OUTPATIENT)
Dept: FAMILY MEDICINE | Facility: CLINIC | Age: 58
End: 2018-11-14

## 2018-11-14 NOTE — TELEPHONE ENCOUNTER
Patient was notified by phone. Patient verbalizes understanding and agreement. Discussed ED precautions.     Johnna LU RN

## 2018-11-14 NOTE — TELEPHONE ENCOUNTER
Beta blockers like metoprolol  will lower the heart rate.  It is possible she is having some mild rebound tachycardia since stopping the metoprolol.  If this is the case, this should improve with a little more time.  Lets monitor the palpitations and heart rate until early next week, and if they continue, let me know and we will make changes at that time.

## 2018-11-14 NOTE — TELEPHONE ENCOUNTER
Started when put on medication 100-116    S-(situation): heart rate problem    B-(background): LOV 11/09/18 Dr Hope:    6. Essential hypertension -potential side effects of metoprolol.  Home blood pressure is better controlled in clinic blood pressure, question white coat hypertension.  Stop metoprolol, start losartan.  BMP in 2 weeks.  - Basic metabolic panel  - losartan-hydrochlorothiazide (HYZAAR) 100-25 MG per tablet; Take 1 tablet by mouth daily  Dispense: 90 tablet; Refill: 3    A-(assessment): Patient states since starting the new medication, she has noticed a feeling that her heart is racing. Her BP remains at goal but HR is 100-116, even at rest.     No chest pain, difficulty breathing, edema, fainting, dizziness    R-(recommendations): Routing to provider. Med change or OV?      Johnna LU RN

## 2018-11-14 NOTE — TELEPHONE ENCOUNTER
Reason for Call:  Other medication problem    Detailed comments: pt calling stating she was put on losartan-hydrochlorothiazide (HYZAAR) 100-25 MG per tablet. She states that she gets palpitations. She states she can feel her heart racing. She said this didn't happen with metoprolol.     Phone Number Patient can be reached at: Home number on file [phone] (home)    Best Time: any    Can we leave a detailed message on this number? YES    Call taken on 11/14/2018 at 1:43 PM by Maye Lainez

## 2018-11-20 ENCOUNTER — ALLIED HEALTH/NURSE VISIT (OUTPATIENT)
Dept: FAMILY MEDICINE | Facility: CLINIC | Age: 58
End: 2018-11-20
Payer: COMMERCIAL

## 2018-11-20 ENCOUNTER — TELEPHONE (OUTPATIENT)
Dept: FAMILY MEDICINE | Facility: CLINIC | Age: 58
End: 2018-11-20

## 2018-11-20 VITALS — HEART RATE: 104 BPM | SYSTOLIC BLOOD PRESSURE: 146 MMHG | DIASTOLIC BLOOD PRESSURE: 86 MMHG

## 2018-11-20 DIAGNOSIS — I10 ESSENTIAL HYPERTENSION: Primary | ICD-10-CM

## 2018-11-20 PROCEDURE — 99207 ZZC NO CHARGE NURSE ONLY: CPT

## 2018-11-20 NOTE — TELEPHONE ENCOUNTER
Reason for Call:  Other call back    Detailed comments: Pt had med change on 11/11/18, from metoprolol to losartan. She is checking in as provider requested and her B/P remains elevated and the losartan makes her heart race.  Please advise.    Phone Number Patient can be reached at: Cell number on file:    Telephone Information:   Mobile [phone]       Best Time: =any    Can we leave a detailed message on this number?     Call taken on 11/20/2018 at 11:10 AM by June Tapia

## 2018-11-20 NOTE — TELEPHONE ENCOUNTER
Pt returned call. Her phone was on silence. Please call her back, it is on now.  June Tapia  CSS

## 2018-11-20 NOTE — TELEPHONE ENCOUNTER
Called pt back. Says she is the clinic right now, with her spouse's appt. She will have lunch with him and keep her 1pm appt with the RN for BP and pulse check.    Goldie TIM RN

## 2018-11-20 NOTE — TELEPHONE ENCOUNTER
Have patient make follow-up with me - a lot going on to give good advice over the phone.  Can see me this week or next.  Continue to monitor blood pressure and pulse.  Pulse is high.

## 2018-11-20 NOTE — TELEPHONE ENCOUNTER
"Patient stopped in for BP recheck,   Vital Signs 11/20/2018 11/20/2018   Systolic 154 146   Diastolic 86 86   Pulse 104          \"Just found out my  has colon cancer so I have a lot going on and I have had a terrible chest cold also. \"     On losartan-hydrochlorothiazide since 11/11/18   Home BP readings brought in:   148/88 p114  137/87 p 110  155/87 p 110   131/80 p 104   130/86 p 118  156/90 p104  157/86 p125  138/87 p111  140/85 p 97  We talked about hydration, she says she has 3 melo per day, I encouraged her to step that up a bit, as she is on the diuretic now which may be dehydrating her.     \"I would rather not change meds, if I don't need to. \"   \"the metoprolol caused such dizziness, and I don't know, I had been on lisinopril and don't know if the cough was really from that med or not. \"    Dr Hope, please advise.     Kelly Dumont RNC            "

## 2018-11-20 NOTE — MR AVS SNAPSHOT
After Visit Summary   11/20/2018    Mechelle Cee    MRN: [id number]           Patient Information     Date Of Birth          1960        Visit Information        Provider Department      11/20/2018 1:00 PM RIVAS LIMA/MESHA GUTIERREZ [facility]        Today's Diagnoses     Essential hypertension    -  1       Follow-ups after your visit        Your next 10 appointments already scheduled     Nov 30, 2018  9:30 AM CST   CT CALCIUM SCREENING with [facility]   [facility] CT ([facility])    [address] [address]   [phone]           How do I prepare for my exam? (Food and drink instructions) It is best to avoid caffeine on the day of your test.  What should I wear: Please wear loose clothing, such as a sweat suit or jogging clothes. Avoid snaps, zippers and other metal. We may ask you to undress and put on a hospital gown.  How long does the exam take: The entire exam will take about 30 minutes or less.  What should I bring: Please bring a list of your current medicines to your exam. (Include vitamins, minerals and over-the-counter medicines.) It is safest to leave personal items at home.  Do I need a : No  is needed.  What do I need to tell my doctor: Be sure to tell your doctor if there is a chance you may be pregnant.  What should I do after the exam: No restrictions, You may resume normal activities.  What is this test: A calcium scoring CT (computed tomography) scan is a painless, highly sensitive test that shows the amount of calcium build-up in your heart arteries. This tells us your future risk for heart attack. The CT scan is a series of pictures that allows us to look at your heart. Our scanner creates images of the heart in cross sections, much like slices of bread. A heart scan should not take the place of your routine doctor visits.  Who should I call with questions: If you have any questions, please call the Imaging Department  where you will have your exam. Directions, parking instructions, and other information is available on our website, [email or web address].              Who to contact     If you have questions or need follow up information about today's clinic visit or your schedule please contact [facility] directly at [phone].  Normal or non-critical lab and imaging results will be communicated to you by MyChart, letter or phone within 4 business days after the clinic has received the results. If you do not hear from us within 7 days, please contact the clinic through [employer] or phone. If you have a critical or abnormal lab result, we will notify you by phone as soon as possible.  Submit refill requests through [employer] or call your pharmacy and they will forward the refill request to us. Please allow 3 business days for your refill to be completed.          Additional Information About Your Visit        MyChart Information     [employer] gives you secure access to your electronic health record. If you see a primary care provider, you can also send messages to your care team and make appointments. If you have questions, please call your primary care clinic.  If you do not have a primary care provider, please call [phone] and they will assist you.        Care EveryWhere ID     This is your Care EveryWhere ID. This could be used by other organizations to access your [facility] medical records  [id number]        Your Vitals Were     Pulse                   104            Blood Pressure from Last 3 Encounters:   11/20/18 146/86   11/09/18 142/86   11/15/17 (!) 155/93    Weight from Last 3 Encounters:   11/09/18 171 lb (77.6 kg)   11/15/17 167 lb 3.2 oz (75.8 kg)   11/01/17 168 lb 6.4 oz (76.4 kg)              Today, you had the following     No orders found for display       Primary Care Provider Office Phone # Fax #    Margarita Hope [phone] [address] [address]         Equal Access to Services     JOYCELYN BOYKIN : Hadii aad ku hadkennethmarium Anderson, wamarkda lurojasismaelha, qajenniferrajendra phillipsguanakitoyovany echols. So [facility] [phone].    ATENCIÓN: Si habla español, tiene a singh disposición servicios gratuitos de asistencia lingüística. Llame al [phone].    We comply with applicable federal civil rights laws and [place] laws. We do not discriminate on the basis of race, color, national origin, age, disability, sex, sexual orientation, or gender identity.            Thank you!     Thank you for choosing [facility]  for your care. Our goal is always to provide you with excellent care. Hearing back from our patients is one way we can continue to improve our services. Please take a few minutes to complete the written survey that you may receive in the mail after your visit with us. Thank you!             Your Updated Medication List - Protect others around you: Learn how to safely use, store and throw away your medicines at www.disposemymeds.org.          This list is accurate as of 11/20/18  1:04 PM.  Always use your most recent med list.                   Brand Name Dispense Instructions for use Diagnosis    losartan-hydrochlorothiazide 100-25 MG per tablet    HYZAAR    90 tablet    Take 1 tablet by mouth daily    Essential hypertension       MULTIVITAMIN ADULT PO

## 2018-11-20 NOTE — TELEPHONE ENCOUNTER
Dr. Hope,      Attempted to contact the patient and needed to leave a message.  Unable to tell what are her bp readings.  Her heart is still racing and I am unable to get a pulse.  I did call [facility] to ensure her Hyzaar is not one on the recall.  [facility] does not use that brand so her medication is good. Tosha ADAM RN

## 2019-02-20 ENCOUNTER — TELEPHONE (OUTPATIENT)
Dept: FAMILY MEDICINE | Facility: CLINIC | Age: 59
End: 2019-02-20

## 2019-02-20 NOTE — TELEPHONE ENCOUNTER
Panel Management Review      Patient has the following on her problem list:     Hypertension   Last three blood pressure readings:  BP Readings from Last 3 Encounters:   11/20/18 146/86   11/09/18 142/86   11/15/17 (!) 155/93     Blood pressure: FAILED    HTN Guidelines:  Age 18-59 BP range:  Less than 140/90  Age 60-85 with Diabetes:  Less than 140/90  Age 60-85 without Diabetes:  less than 150/90      Composite cancer screening  Chart review shows that this patient is due/due soon for the following Mammogram  Summary:    Patient is due/failing the following:   BP CHECK and MAMMOGRAM    Action needed:   Patient needs office visit for Mammogram. and Patient needs nurse only appointment.    Type of outreach:    Sent [employer] message.    Questions for provider review:    None                                                                                                                                    Yesy Bledsoe CMA ([facility]AMA)   (aka: Rosi Bledsoe)      Chart routed to Care Team .

## 2019-02-22 DIAGNOSIS — Z12.11 SCREEN FOR COLON CANCER: ICD-10-CM

## 2019-02-22 LAB — HEMOCCULT STL QL IA: NEGATIVE

## 2019-02-22 PROCEDURE — 82274 ASSAY TEST FOR BLOOD FECAL: CPT | Performed by: INTERNAL MEDICINE

## 2019-03-08 NOTE — TELEPHONE ENCOUNTER
(1st attempt) Left a voice msg for pt to call back.  When she does schedule BP recheck with RN + transfer to the care team for instructions on colonoscopy.  Yesy Bledsoe CMA ([facility])   (aka: Rosi Bledsoe)

## 2019-04-11 NOTE — TELEPHONE ENCOUNTER
(2nd attempt) Left a voice msg for pt to call back.  When she does schedule BP recheck with RN + transfer to the care team for instructions on colonoscopy.  Yesy Bledsoe CMA ([facility])   (aka: Rosi Bledsoe)

## 2019-04-18 NOTE — TELEPHONE ENCOUNTER
(3rd attempt) Left a voice msg for pt to call back.  When she does schedule BP recheck with RN + transfer to the care team for instructions on colonoscopy.  Yesy Bledsoe CMA ([facility])   (aka: Rosi Bledsoe)

## 2019-05-23 ENCOUNTER — OFFICE VISIT (OUTPATIENT)
Dept: FAMILY MEDICINE | Facility: CLINIC | Age: 59
End: 2019-05-23
Payer: COMMERCIAL

## 2019-05-23 VITALS
DIASTOLIC BLOOD PRESSURE: 90 MMHG | OXYGEN SATURATION: 100 % | RESPIRATION RATE: 12 BRPM | TEMPERATURE: 97.8 F | SYSTOLIC BLOOD PRESSURE: 160 MMHG | WEIGHT: 163 LBS | BODY MASS INDEX: 31.18 KG/M2 | HEART RATE: 104 BPM

## 2019-05-23 DIAGNOSIS — R00.2 PALPITATIONS: Primary | ICD-10-CM

## 2019-05-23 LAB
ANION GAP SERPL CALCULATED.3IONS-SCNC: 5 MMOL/L (ref 3–14)
BUN SERPL-MCNC: 11 MG/DL (ref 7–30)
CALCIUM SERPL-MCNC: 8.9 MG/DL (ref 8.5–10.1)
CHLORIDE SERPL-SCNC: 98 MMOL/L (ref 94–109)
CO2 SERPL-SCNC: 33 MMOL/L (ref 20–32)
CREAT SERPL-MCNC: 0.6 MG/DL (ref 0.52–1.04)
ERYTHROCYTE [DISTWIDTH] IN BLOOD BY AUTOMATED COUNT: 12.7 % (ref 10–15)
GFR SERPL CREATININE-BSD FRML MDRD: >90 ML/MIN/{1.73_M2}
GLUCOSE SERPL-MCNC: 77 MG/DL (ref 70–99)
HCT VFR BLD AUTO: 43.7 % (ref 35–47)
HGB BLD-MCNC: 14.9 G/DL (ref 11.7–15.7)
MCH RBC QN AUTO: 31.4 PG (ref 26.5–33)
MCHC RBC AUTO-ENTMCNC: 34.1 G/DL (ref 31.5–36.5)
MCV RBC AUTO: 92 FL (ref 78–100)
PLATELET # BLD AUTO: 238 10E9/L (ref 150–450)
POTASSIUM SERPL-SCNC: 3.1 MMOL/L (ref 3.4–5.3)
RBC # BLD AUTO: 4.75 10E12/L (ref 3.8–5.2)
SODIUM SERPL-SCNC: 136 MMOL/L (ref 133–144)
T4 FREE SERPL-MCNC: 0.91 NG/DL (ref 0.76–1.46)
TSH SERPL DL<=0.005 MIU/L-ACNC: 4.27 MU/L (ref 0.4–4)
WBC # BLD AUTO: 5.1 10E9/L (ref 4–11)

## 2019-05-23 PROCEDURE — 84439 ASSAY OF FREE THYROXINE: CPT | Performed by: INTERNAL MEDICINE

## 2019-05-23 PROCEDURE — 93000 ELECTROCARDIOGRAM COMPLETE: CPT | Performed by: INTERNAL MEDICINE

## 2019-05-23 PROCEDURE — 80048 BASIC METABOLIC PNL TOTAL CA: CPT | Performed by: INTERNAL MEDICINE

## 2019-05-23 PROCEDURE — 36415 COLL VENOUS BLD VENIPUNCTURE: CPT | Performed by: INTERNAL MEDICINE

## 2019-05-23 PROCEDURE — 85027 COMPLETE CBC AUTOMATED: CPT | Performed by: INTERNAL MEDICINE

## 2019-05-23 PROCEDURE — 84443 ASSAY THYROID STIM HORMONE: CPT | Performed by: INTERNAL MEDICINE

## 2019-05-23 PROCEDURE — 99214 OFFICE O/P EST MOD 30 MIN: CPT | Performed by: INTERNAL MEDICINE

## 2019-05-23 NOTE — PATIENT INSTRUCTIONS
1. Labs today  2. You need to have a cardiology test done.  Please call [phone] to schedule.   3. Return to clinic if palpitations are worse.

## 2019-05-23 NOTE — PROGRESS NOTES
"Subjective     Mechelle Cee is a 59 year old female who presents to clinic today for the following health issues:    HPI   Concern - Missing heart beats  Onset: this morning    Description:   Pt noticed this AM that she was missing some heart beats she had a good night sleep, during this time she couldn't feel her pulse, took her BP in AM and was okay ( 130-72) and this afternoon 165/90    Intensity: moderate    Progression of Symptoms:  worsening and intermittent    Accompanying Signs & Symptoms:  No fever, no nauseas, no vomiting, some cough, no jaw or arm pain or numbness,  and ear ache     Previous history of similar problem:   na    Precipitating factors:   Worsened by: na    Alleviating factors:Improved by: na    Therapies Tried and outcome: na    Checked pulse and it was steady/regular, then during episode would feel pauses    Long history of palpitations, but feels they are worsening    Sometimes the palpitations last a long time and she feels like she might faint.    1/2 cup coffee/day.  Very rare Etoh use.     From note in 2017 \"Feeling palpitations and occasional dizziness.  Occurs every 2 weeks or so.  Pulse 70s mostly.  Episodes last < 1 min.  She gets sensation that she has to cough, and she does, and episode can resolve.\"  We have discussed palpitations as far back as 2/2015.    Uri:has cold virus, mild cough, headache, mild nasal congestion/rhinorrhea. Symptoms are very mild and would not be here to discuss these symptoms if not for the palpitations.      --under more stress,  diagnosed with stage 4 colon cancer.          Current Outpatient Medications   Medication Sig Dispense Refill     losartan-hydrochlorothiazide (HYZAAR) 100-25 MG per tablet Take 1 tablet by mouth daily 90 tablet 3     Multiple Vitamins-Minerals (MULTIVITAMIN ADULT PO)          Reviewed and updated as needed this visit by Provider         Review of Systems   ROS COMP: Constitutional, HEENT, cardiovascular, pulmonary, " gi and gu systems are negative, except as otherwise noted.      Objective    /90   Pulse 104   Temp 97.8  F (36.6  C) (Tympanic)   Resp 12   Wt 73.9 kg (163 lb)   SpO2 100%   Breastfeeding? Yes   BMI 31.18 kg/m    Body mass index is 31.18 kg/m .  Physical Exam   GENERAL APPEARANCE: healthy, alert and no distress  EYES: Eyes grossly normal to inspection, PERRL and conjunctivae and sclerae normal  HENT: ear canals and TM's normal and nose and mouth without ulcers or lesions  NECK: no adenopathy, no asymmetry, masses, or scars and thyroid normal to palpation  RESP: lungs clear to auscultation - no rales, rhonchi or wheezes  CV: regular rates and rhythm, normal S1 S2, no S3 or S4 and no murmur, click or rub    Diagnostic Test Results:  Labs reviewed in Epic  No results found for this or any previous visit (from the past 24 hour(s)).  EKG - ST depression, non-specific, unchanged since 2015.  Sinus tachycardia        Assessment & Plan     1. Palpitations -PVC versus SVT versus A. fib versus other.  Stress is likely contributing some to the palpitations.  Her history of palpitations that improved with Valsalva maneuver fever SVT.  Has not had full work-up, so we will pursue 1 week monitor.  If the monitor does not capture any episodes of symptomatic palpitations, may need to discuss wearing a longer event monitor versus monitoring.  Advised patient to return to clinic if the palpitations worsen  - EKG 12-lead complete w/read - Clinics  - CBC with platelets  - TSH with free T4 reflex  - Basic metabolic panel  - Zio Patch Holter Adult Pediatric Greater than 48 hrs; Future       Patient Instructions   1. Labs today  2. You need to have a cardiology test done.  Please call [phone] to schedule.   3. Return to clinic if palpitations are worse.      Return in about 1 week (around 5/30/2019) for If symptoms don't improve.    Dr. Margarita Hope, DO  [facility]

## 2019-11-25 DIAGNOSIS — I10 ESSENTIAL HYPERTENSION: Primary | ICD-10-CM

## 2019-11-26 RX ORDER — HYDROCHLOROTHIAZIDE 25 MG/1
TABLET ORAL
Qty: 30 TABLET | Refills: 8 | OUTPATIENT
Start: 2019-11-26

## 2019-11-26 RX ORDER — LOSARTAN POTASSIUM 100 MG/1
TABLET ORAL
Qty: 30 TABLET | Refills: 8 | OUTPATIENT
Start: 2019-11-26

## 2019-11-26 NOTE — TELEPHONE ENCOUNTER
"Requested Prescriptions   Pending Prescriptions Disp Refills     losartan (COZAAR) 100 MG tablet [Pharmacy Med Name: LOSARTAN 100MG   TAB] 30 tablet 8     Sig: TAKE 1 TABLET BY MOUTH ONCE DAILY       Angiotensin-II Receptors Failed - 11/25/2019  6:46 PM        Failed - Last blood pressure under 140/90 in past 12 months     BP Readings from Last 3 Encounters:   05/23/19 160/90   11/20/18 146/86   11/09/18 142/86                 Failed - Medication is active on med list        Failed - Normal serum potassium on file in past 12 months     Recent Labs   Lab Test 05/23/19  1608   POTASSIUM 3.1*                    Passed - Recent (12 mo) or future (30 days) visit within the authorizing provider's specialty     Patient has had an office visit with the authorizing provider or a provider within the authorizing providers department within the previous 12 mos or has a future within next 30 days. See \"Patient Info\" tab in inbasket, or \"Choose Columns\" in Meds & Orders section of the refill encounter.              Passed - Patient is age 18 or older        Passed - No active pregnancy on record        Passed - Normal serum creatinine on file in past 12 months     Recent Labs   Lab Test 05/23/19  1608   CR 0.60             Passed - No positive pregnancy test in past 12 months        hydrochlorothiazide (HYDRODIURIL) 25 MG tablet [Pharmacy Med Name: HYDROCHLOROT 25MG   TAB] 30 tablet 8     Sig: TAKE 1 TABLET BY MOUTH ONCE DAILY       Diuretics (Including Combos) Protocol Failed - 11/25/2019  6:46 PM        Failed - Blood pressure under 140/90 in past 12 months     BP Readings from Last 3 Encounters:   05/23/19 160/90   11/20/18 146/86   11/09/18 142/86                 Failed - Medication is active on med list        Failed - Normal serum potassium on file in past 12 months     Recent Labs   Lab Test 05/23/19  1608   POTASSIUM 3.1*                    Passed - Recent (12 mo) or future (30 days) visit within the authorizing " "provider's specialty     Patient has had an office visit with the authorizing provider or a provider within the authorizing providers department within the previous 12 mos or has a future within next 30 days. See \"Patient Info\" tab in inbasket, or \"Choose Columns\" in Meds & Orders section of the refill encounter.              Passed - Patient is age 18 or older        Passed - No active pregancy on record        Passed - Normal serum creatinine on file in past 12 months     Recent Labs   Lab Test 05/23/19  1608   CR 0.60              Passed - Normal serum sodium on file in past 12 months     Recent Labs   Lab Test 05/23/19  1608                 Passed - No positive pregnancy test in past 12 months          losartan-hydrochlorothiazide (HYZAAR) 100-25 MG per tablet  Last Written Prescription Date:  11/9/2018  Last Fill Quantity: 90,  # refills: 3   Last office visit: 5/23/2019 with prescribing provider:  Jayy    Future Office Visit:          "

## 2019-11-29 RX ORDER — LOSARTAN POTASSIUM 100 MG/1
100 TABLET ORAL DAILY
Qty: 30 TABLET | Refills: 1 | Status: SHIPPED | OUTPATIENT
Start: 2019-11-29

## 2019-11-29 RX ORDER — HYDROCHLOROTHIAZIDE 25 MG/1
25 TABLET ORAL DAILY
Qty: 30 TABLET | Refills: 1 | Status: SHIPPED | OUTPATIENT
Start: 2019-11-29

## 2019-11-29 NOTE — TELEPHONE ENCOUNTER
Patient was suppose to f/u in clinic lab results:    Patient Result Comments     Viewed by Mechelle Cee on 5/24/2019  2:57 PM   Written by Barbara Stewart APRN CNP on 5/24/2019 10:38 AM   Covering for Dr Hope:   Thyroid is in a normal range.   Kidney function is normal.     Potassium is mildly low.   Recommend eating foods rich in potassium over the weekend.   Such as bananas, oranges, cantaloupe, potatoes, spinach, broccoli.     Follow up with Dr Hope next week for recheck.   Barbara Stewart CNP        Patient reports she has been taking care of her  and has not had time to f/u. Patient was scheduled for physical on 1/3/20.  Ok to give small refill?  The patient states she takes this medication separately but it was ordered as a combined med.

## 2020-01-24 ENCOUNTER — TELEPHONE (OUTPATIENT)
Dept: INTERNAL MEDICINE | Facility: CLINIC | Age: 60
End: 2020-01-24

## 2020-01-24 NOTE — TELEPHONE ENCOUNTER
Panel Management Review    Composite cancer screening  Chart review shows that this patient is due/due soon for the following Pap Smear, Mammogram, Colonoscopy and Fecal Colorectal (FIT)  Summary:    Patient is due/failing the following:   COLONOSCOPY, FIT, MAMMOGRAM and PAP    Action needed:   Patient needs office visit for Mammogram, Pap, Colonoscopy/Fit test (need orders)    Type of outreach:    Sent [employer] message.    Questions for provider review:    None                                                                                                                                    Yesy Bledsoe CMA ([facility])   (aka: Rosi Bledsoe)       Chart routed to Care Team .

## 2020-02-05 NOTE — TELEPHONE ENCOUNTER
(2nd attempt) Left a voice msg for pt to call back.  Whenever she calls help schedule Pap Smear, Mammogram, and transfer to discuss Colonoscopy and Fecal Colorectal (FIT).  Yesy Bledsoe CMA ([facility])   (aka: Rosi Bledsoe)

## 2020-02-10 ENCOUNTER — HEALTH MAINTENANCE LETTER (OUTPATIENT)
Age: 60
End: 2020-02-10

## 2020-02-18 NOTE — TELEPHONE ENCOUNTER
(3rd attempt) Left a voice msg for pt to call back.  Whenever she calls help schedule Pap Smear, Mammogram, and transfer to discuss Colonoscopy and Fecal Colorectal (FIT).  Yesy Bledsoe CMA ([facility])   (aka: Rosi Bledsoe)

## 2020-11-13 ENCOUNTER — TRANSFERRED RECORDS (OUTPATIENT)
Dept: HEALTH INFORMATION MANAGEMENT | Facility: CLINIC | Age: 60
End: 2020-11-13

## 2020-11-14 ENCOUNTER — HEALTH MAINTENANCE LETTER (OUTPATIENT)
Age: 60
End: 2020-11-14

## 2021-02-05 ENCOUNTER — HOSPITAL ENCOUNTER (OUTPATIENT)
Dept: MAMMOGRAPHY | Facility: CLINIC | Age: 61
End: 2021-02-05
Attending: INTERNAL MEDICINE
Payer: COMMERCIAL

## 2021-02-05 DIAGNOSIS — R92.8 ABNORMAL MAMMOGRAM: ICD-10-CM

## 2021-02-05 PROCEDURE — G0279 TOMOSYNTHESIS, MAMMO: HCPCS

## 2021-03-28 ENCOUNTER — HEALTH MAINTENANCE LETTER (OUTPATIENT)
Age: 61
End: 2021-03-28

## 2021-09-12 ENCOUNTER — HEALTH MAINTENANCE LETTER (OUTPATIENT)
Age: 61
End: 2021-09-12

## 2021-11-12 ENCOUNTER — APPOINTMENT (OUTPATIENT)
Dept: MRI IMAGING | Facility: CLINIC | Age: 61
End: 2021-11-12
Attending: EMERGENCY MEDICINE
Payer: COMMERCIAL

## 2021-11-12 ENCOUNTER — APPOINTMENT (OUTPATIENT)
Dept: CT IMAGING | Facility: CLINIC | Age: 61
End: 2021-11-12
Attending: EMERGENCY MEDICINE
Payer: COMMERCIAL

## 2021-11-12 ENCOUNTER — HOSPITAL ENCOUNTER (EMERGENCY)
Facility: CLINIC | Age: 61
Discharge: HOME | End: 2021-11-12
Attending: EMERGENCY MEDICINE | Admitting: EMERGENCY MEDICINE
Payer: COMMERCIAL

## 2021-11-12 VITALS
TEMPERATURE: 98.2 F | RESPIRATION RATE: 23 BRPM | HEIGHT: 61 IN | DIASTOLIC BLOOD PRESSURE: 78 MMHG | OXYGEN SATURATION: 93 % | WEIGHT: 169 LBS | HEART RATE: 123 BPM | BODY MASS INDEX: 31.91 KG/M2 | SYSTOLIC BLOOD PRESSURE: 140 MMHG

## 2021-11-12 DIAGNOSIS — R00.0 SINUS TACHYCARDIA: ICD-10-CM

## 2021-11-12 DIAGNOSIS — G51.0 LEFT-SIDED BELL'S PALSY: ICD-10-CM

## 2021-11-12 DIAGNOSIS — E87.6 HYPOKALEMIA: ICD-10-CM

## 2021-11-12 DIAGNOSIS — I67.9: ICD-10-CM

## 2021-11-12 LAB
ANION GAP SERPL CALCULATED.3IONS-SCNC: 14 MMOL/L (ref 3–14)
APTT PPP: 33 SECONDS (ref 22–38)
B BURGDOR IGG+IGM SER QL: 0.04
BASOPHILS # BLD AUTO: 0.1 10E3/UL (ref 0–0.2)
BASOPHILS NFR BLD AUTO: 1 %
BUN SERPL-MCNC: 14 MG/DL (ref 7–30)
CALCIUM SERPL-MCNC: 9.4 MG/DL (ref 8.5–10.1)
CHLORIDE BLD-SCNC: 103 MMOL/L (ref 94–109)
CO2 SERPL-SCNC: 23 MMOL/L (ref 20–32)
CREAT SERPL-MCNC: 0.66 MG/DL (ref 0.52–1.04)
D DIMER PPP FEU-MCNC: 0.31 UG/ML FEU (ref 0–0.5)
EOSINOPHIL # BLD AUTO: 0.5 10E3/UL (ref 0–0.7)
EOSINOPHIL NFR BLD AUTO: 6 %
ERYTHROCYTE [DISTWIDTH] IN BLOOD BY AUTOMATED COUNT: 12.1 % (ref 10–15)
GFR SERPL CREATININE-BSD FRML MDRD: >90 ML/MIN/1.73M2
GLUCOSE BLD-MCNC: 89 MG/DL (ref 70–99)
GLUCOSE BLDC GLUCOMTR-MCNC: 84 MG/DL (ref 70–99)
HCT VFR BLD AUTO: 46.4 % (ref 35–47)
HGB BLD-MCNC: 15.7 G/DL (ref 11.7–15.7)
IMM GRANULOCYTES # BLD: 0 10E3/UL
IMM GRANULOCYTES NFR BLD: 0 %
INR PPP: 0.94 (ref 0.85–1.15)
LYMPHOCYTES # BLD AUTO: 3.2 10E3/UL (ref 0.8–5.3)
LYMPHOCYTES NFR BLD AUTO: 35 %
MAGNESIUM SERPL-MCNC: 2.1 MG/DL (ref 1.6–2.3)
MCH RBC QN AUTO: 30.6 PG (ref 26.5–33)
MCHC RBC AUTO-ENTMCNC: 33.8 G/DL (ref 31.5–36.5)
MCV RBC AUTO: 90 FL (ref 78–100)
MONOCYTES # BLD AUTO: 0.7 10E3/UL (ref 0–1.3)
MONOCYTES NFR BLD AUTO: 8 %
NEUTROPHILS # BLD AUTO: 4.7 10E3/UL (ref 1.6–8.3)
NEUTROPHILS NFR BLD AUTO: 50 %
NRBC # BLD AUTO: 0 10E3/UL
NRBC BLD AUTO-RTO: 0 /100
PLATELET # BLD AUTO: 337 10E3/UL (ref 150–450)
POTASSIUM BLD-SCNC: 3 MMOL/L (ref 3.4–5.3)
RBC # BLD AUTO: 5.13 10E6/UL (ref 3.8–5.2)
SARS-COV-2 RNA RESP QL NAA+PROBE: NEGATIVE
SODIUM SERPL-SCNC: 140 MMOL/L (ref 133–144)
T4 FREE SERPL-MCNC: 0.94 NG/DL (ref 0.76–1.46)
TROPONIN I SERPL-MCNC: <0.015 UG/L (ref 0–0.04)
TSH SERPL DL<=0.005 MIU/L-ACNC: 5.71 MU/L (ref 0.4–4)
WBC # BLD AUTO: 9.3 10E3/UL (ref 4–11)

## 2021-11-12 PROCEDURE — 84443 ASSAY THYROID STIM HORMONE: CPT | Performed by: EMERGENCY MEDICINE

## 2021-11-12 PROCEDURE — 70450 CT HEAD/BRAIN W/O DYE: CPT

## 2021-11-12 PROCEDURE — 93005 ELECTROCARDIOGRAM TRACING: CPT | Performed by: EMERGENCY MEDICINE

## 2021-11-12 PROCEDURE — 83735 ASSAY OF MAGNESIUM: CPT | Performed by: EMERGENCY MEDICINE

## 2021-11-12 PROCEDURE — 258N000003 HC RX IP 258 OP 636: Performed by: EMERGENCY MEDICINE

## 2021-11-12 PROCEDURE — 250N000011 HC RX IP 250 OP 636: Performed by: EMERGENCY MEDICINE

## 2021-11-12 PROCEDURE — 85610 PROTHROMBIN TIME: CPT | Performed by: EMERGENCY MEDICINE

## 2021-11-12 PROCEDURE — 85730 THROMBOPLASTIN TIME PARTIAL: CPT | Performed by: EMERGENCY MEDICINE

## 2021-11-12 PROCEDURE — 85379 FIBRIN DEGRADATION QUANT: CPT | Performed by: EMERGENCY MEDICINE

## 2021-11-12 PROCEDURE — 85025 COMPLETE CBC W/AUTO DIFF WBC: CPT | Performed by: EMERGENCY MEDICINE

## 2021-11-12 PROCEDURE — 70553 MRI BRAIN STEM W/O & W/DYE: CPT

## 2021-11-12 PROCEDURE — C9803 HOPD COVID-19 SPEC COLLECT: HCPCS | Performed by: EMERGENCY MEDICINE

## 2021-11-12 PROCEDURE — 250N000009 HC RX 250: Performed by: EMERGENCY MEDICINE

## 2021-11-12 PROCEDURE — 36415 COLL VENOUS BLD VENIPUNCTURE: CPT | Performed by: EMERGENCY MEDICINE

## 2021-11-12 PROCEDURE — 250N000012 HC RX MED GY IP 250 OP 636 PS 637: Performed by: EMERGENCY MEDICINE

## 2021-11-12 PROCEDURE — 250N000013 HC RX MED GY IP 250 OP 250 PS 637: Performed by: EMERGENCY MEDICINE

## 2021-11-12 PROCEDURE — 99291 CRITICAL CARE FIRST HOUR: CPT | Mod: 25 | Performed by: EMERGENCY MEDICINE

## 2021-11-12 PROCEDURE — 70496 CT ANGIOGRAPHY HEAD: CPT

## 2021-11-12 PROCEDURE — 84484 ASSAY OF TROPONIN QUANT: CPT | Performed by: EMERGENCY MEDICINE

## 2021-11-12 PROCEDURE — 255N000002 HC RX 255 OP 636: Performed by: EMERGENCY MEDICINE

## 2021-11-12 PROCEDURE — 99285 EMERGENCY DEPT VISIT HI MDM: CPT | Mod: 25 | Performed by: EMERGENCY MEDICINE

## 2021-11-12 PROCEDURE — 96374 THER/PROPH/DIAG INJ IV PUSH: CPT | Performed by: EMERGENCY MEDICINE

## 2021-11-12 PROCEDURE — 80048 BASIC METABOLIC PNL TOTAL CA: CPT | Performed by: EMERGENCY MEDICINE

## 2021-11-12 PROCEDURE — 86618 LYME DISEASE ANTIBODY: CPT | Performed by: EMERGENCY MEDICINE

## 2021-11-12 PROCEDURE — A9585 GADOBUTROL INJECTION: HCPCS | Performed by: EMERGENCY MEDICINE

## 2021-11-12 PROCEDURE — 84439 ASSAY OF FREE THYROXINE: CPT | Performed by: EMERGENCY MEDICINE

## 2021-11-12 PROCEDURE — 93010 ELECTROCARDIOGRAM REPORT: CPT | Performed by: EMERGENCY MEDICINE

## 2021-11-12 PROCEDURE — 87635 SARS-COV-2 COVID-19 AMP PRB: CPT | Performed by: EMERGENCY MEDICINE

## 2021-11-12 RX ORDER — IOPAMIDOL 755 MG/ML
100 INJECTION, SOLUTION INTRAVASCULAR ONCE
Status: COMPLETED | OUTPATIENT
Start: 2021-11-12 | End: 2021-11-12

## 2021-11-12 RX ORDER — METOPROLOL SUCCINATE 25 MG/1
25 TABLET, EXTENDED RELEASE ORAL DAILY
Qty: 30 TABLET | Refills: 0 | Status: SHIPPED | OUTPATIENT
Start: 2021-11-12 | End: 2021-12-12

## 2021-11-12 RX ORDER — POTASSIUM CHLORIDE 1.5 G/1.58G
20 POWDER, FOR SOLUTION ORAL ONCE
Status: COMPLETED | OUTPATIENT
Start: 2021-11-12 | End: 2021-11-12

## 2021-11-12 RX ORDER — GADOBUTROL 604.72 MG/ML
7.5 INJECTION INTRAVENOUS ONCE
Status: COMPLETED | OUTPATIENT
Start: 2021-11-12 | End: 2021-11-12

## 2021-11-12 RX ORDER — PREDNISONE 20 MG/1
60 TABLET ORAL ONCE
Status: COMPLETED | OUTPATIENT
Start: 2021-11-12 | End: 2021-11-12

## 2021-11-12 RX ORDER — PREDNISONE 20 MG/1
TABLET ORAL
Qty: 21 TABLET | Refills: 0 | Status: SHIPPED | OUTPATIENT
Start: 2021-11-12

## 2021-11-12 RX ORDER — VALACYCLOVIR HYDROCHLORIDE 500 MG/1
1000 TABLET, FILM COATED ORAL ONCE
Status: COMPLETED | OUTPATIENT
Start: 2021-11-12 | End: 2021-11-12

## 2021-11-12 RX ORDER — LORAZEPAM 2 MG/ML
2 INJECTION INTRAMUSCULAR ONCE
Status: COMPLETED | OUTPATIENT
Start: 2021-11-12 | End: 2021-11-12

## 2021-11-12 RX ORDER — POTASSIUM CHLORIDE 1500 MG/1
20 TABLET, EXTENDED RELEASE ORAL 2 TIMES DAILY
Qty: 14 TABLET | Refills: 0 | Status: SHIPPED | OUTPATIENT
Start: 2021-11-12 | End: 2021-11-19

## 2021-11-12 RX ORDER — VALACYCLOVIR HYDROCHLORIDE 1 G/1
1000 TABLET, FILM COATED ORAL 3 TIMES DAILY
Qty: 21 TABLET | Refills: 0 | Status: SHIPPED | OUTPATIENT
Start: 2021-11-12 | End: 2021-11-19

## 2021-11-12 RX ADMIN — POTASSIUM CHLORIDE 20 MEQ: 1.5 FOR SOLUTION ORAL at 19:17

## 2021-11-12 RX ADMIN — PREDNISONE 60 MG: 20 TABLET ORAL at 12:44

## 2021-11-12 RX ADMIN — GADOBUTROL 7.5 ML: 604.72 INJECTION INTRAVENOUS at 14:20

## 2021-11-12 RX ADMIN — SODIUM CHLORIDE 100 ML: 9 INJECTION, SOLUTION INTRAVENOUS at 11:09

## 2021-11-12 RX ADMIN — IOPAMIDOL 70 ML: 755 INJECTION, SOLUTION INTRAVENOUS at 11:08

## 2021-11-12 RX ADMIN — VALACYCLOVIR HYDROCHLORIDE 1000 MG: 500 TABLET, FILM COATED ORAL at 12:43

## 2021-11-12 RX ADMIN — LORAZEPAM 2 MG: 2 INJECTION INTRAMUSCULAR; INTRAVENOUS at 14:12

## 2021-11-12 ASSESSMENT — MIFFLIN-ST. JEOR: SCORE: 1278.96

## 2021-11-12 NOTE — DISCHARGE INSTRUCTIONS
Begin Metoprolol ER (extended release) 25 mg once daily.  Monitor blood pressure and heart rate with goal to keep blood pressure less than 140/90 (systolic top number less than 140 and diastolic bottom number less than 90) while keeping heart rate greater than 60 with goal heart rate between 60 and 100 at rest (heart rate less than 100 but greater than 60).    If heart rate remains elevated (greater than 100) and systolic blood pressure (top number) remains above 110, you may increase Metoprolol to 1.5 tablets once daily = 37.5 mg daily.    If your systolic blood pressure (top number) drops below 110 while on metoprolol, you can decrease losartan by one half.    Monitor and record your blood pressures and heart rates and report these to your primary care provider as soon as possible in follow-up next week.

## 2021-11-12 NOTE — ED NOTES
Pt reports having dizziness at 9pm but denies at this time. At 9:58am, started feeling L sided facial numbness and noticed that L eye wasn't blinking. Pt also noticed droop in L side of mouth when smiling. Pt denies chest, respiratory distress, headache, or visual changes.

## 2021-11-12 NOTE — ED NOTES
Patient placed immediately into room for stroke symptoms, last known normal 2100 yesterday, dizziness started. Stroke evaluation completed, Dr. Chamberlain at bedside. Patient placed on continuous cardiac monitoring, blood pressure and oxygen saturation monitoring. EKG done on arrival to room, blood sugar obtained and was 80. PIV placed, labs drawn and sent by Leyla GUTIERREZ. Patient taken to CT at 1105 with Lisa GUTIERREZ student at bedside on monitor. See neuro assessment for details, will continue to monitor.

## 2021-11-12 NOTE — ED PROVIDER NOTES
History     Chief Complaint   Patient presents with     Facial Droop     onset 2 hours ago     Dizziness     onset 2100 last night     10:59 AM - Called emergently for rapid assessment of patient with strokelike symptoms.  Will assess for initiation of code stroke.    11:02 AM - Tier 1 code stroke initiated.    HPI  Mechelle Cee is a 61 year old female who presents to the ED for evaluation of dizziness and rapid palpitations which began 13.75 hours ago (9:00 PM last evening) and left facial droop and difficulty closing her left eyelid 2 hours ago (8:45 AM).  She has no swallowing difficulty, speech difficulty, eye pain or visual deficit or abnormality, headache or neck pain.  No motor or sensory deficit.  No eye redness, mattering or drainage or facial rash.  She has had recent mild rhinorrhea and ear congestion, but no ear pain.  No cough or other URI symptoms or signs or symptoms of COVID-19 illness.  Prior history of similar rapid palpitations without syncope and she reports prior evaluation remarkable for benign tachycardia of unclear etiology, ? treated with beta-blocker/metoprolol which was discontinued quite some time by her PMD, for unclear reason.  She reports prior cardiac monitor study, Echo and ?  Cardiology referral apparent benign etiology.  She reports that her baseline heart rate is often in the 120s-140s.  With her palpitation last evening she had no shortness of breath, chest pain, sweating/diaphoresis or syncope.  No cough, hemoptysis, leg pain or leg swelling.  No history of DVT/PE.  She has no history of hyperthyroidism.  No other acute complaints or concerns.    Allergies:  Allergies   Allergen Reactions     Amlodipine Swelling     Leg swelling     Tessalon Perles [Benzonatate-Fd&C Yellow #10]        Problem List:    Patient Active Problem List    Diagnosis Date Noted     Family history of coronary artery disease in father 11/09/2018     Priority: Medium     Hyperlipidemia LDL goal <130  11/09/2018     Priority: Medium     Chronic cough 05/05/2017     Priority: Medium     Essential hypertension 04/08/2016     Priority: Medium     Syncope, unspecified syncope type 04/08/2016     Priority: Medium     Overweight (BMI 25.0-29.9) 02/06/2012     Priority: Medium     ideal weight 110, goal weight 138 pounds, lose 15 lbs in 15 weeks.          Past Medical History:    Past Medical History:   Diagnosis Date     Heart disease      Unspecified essential hypertension 1997       Past Surgical History:    Past Surgical History:   Procedure Laterality Date     GYN SURGERY  4-    Tubes Tied     SURGICAL HISTORY OF -   1997    tubal ligation     SURGICAL HISTORY OF -   1987,1988,1997    Normal Spontaneous Vaginal Deliveries       Family History:    Family History   Problem Relation Age of Onset     Hypertension Mother      Lipids Mother      Substance Abuse Mother         Alcoholism     Heart Disease Father      Hypertension Father      Coronary Artery Disease Father      Blood Disease Sister         leukemia     Other Cancer Sister         Leukemia     Breast Cancer No family hx of      Cancer - colorectal No family hx of        Social History:  Marital Status:   [2]  Social History     Tobacco Use     Smoking status: Never Smoker     Smokeless tobacco: Never Used   Substance Use Topics     Alcohol use: Yes     Comment: 4x/week     Drug use: No        Medications:    metoprolol succinate ER (TOPROL-XL) 25 MG 24 hr tablet  potassium chloride ER (K-TAB) 20 MEQ CR tablet  predniSONE (DELTASONE) 20 MG tablet  valACYclovir (VALTREX) 1000 mg tablet  hydrochlorothiazide (HYDRODIURIL) 25 MG tablet  losartan (COZAAR) 100 MG tablet  losartan-hydrochlorothiazide (HYZAAR) 100-25 MG per tablet  Multiple Vitamins-Minerals (MULTIVITAMIN ADULT PO)      Review of Systems  As mentioned above in the history present illness.  All other systems were reviewed and are negative.    Physical Exam   BP: (!) 206/98  Pulse: (!)  "135  Temp: 98.2  F (36.8  C)  Resp: 20  Height: 154.9 cm (5' 1\")  Weight: 76.7 kg (169 lb)  SpO2: 98 %      Physical Exam  Vitals and nursing note reviewed.   Constitutional:       General: She is not in acute distress.     Appearance: Normal appearance. She is well-developed. She is not ill-appearing or diaphoretic.   HENT:      Head: Normocephalic and atraumatic.      Right Ear: Tympanic membrane, ear canal and external ear normal.      Left Ear: Tympanic membrane, ear canal and external ear normal.      Nose: Nose normal.      Mouth/Throat:      Mouth: Mucous membranes are moist.   Eyes:      General: No scleral icterus.        Right eye: No discharge.         Left eye: No discharge.      Extraocular Movements: Extraocular movements intact.      Conjunctiva/sclera: Conjunctivae normal.      Pupils: Pupils are equal, round, and reactive to light.   Neck:      Vascular: No carotid bruit.      Trachea: No tracheal deviation.   Cardiovascular:      Rate and Rhythm: Regular rhythm. Tachycardia present.      Heart sounds: Normal heart sounds. No murmur heard.  No friction rub. No gallop.    Pulmonary:      Effort: Pulmonary effort is normal. No respiratory distress.      Breath sounds: Normal breath sounds. No stridor. No wheezing, rhonchi or rales.   Abdominal:      General: There is no distension.      Palpations: Abdomen is soft.   Musculoskeletal:         General: No swelling or tenderness. Normal range of motion.      Cervical back: Normal range of motion and neck supple.      Right lower leg: No edema.      Left lower leg: No edema.   Skin:     General: Skin is warm and dry.      Coloration: Skin is not pale.      Findings: No erythema, lesion or rash.   Neurological:      General: No focal deficit present.      Mental Status: She is alert and oriented to person, place, and time.      Cranial Nerves: Cranial nerve deficit ( Left facial weakness, lower face greater than upper face with left facial droop and ptosis " with decreased eye closure strength; she is able to close left eye fully.) and facial asymmetry ( Left facial droop) present. No dysarthria.      Sensory: Sensation is intact. No sensory deficit.      Motor: Motor function is intact. No weakness or abnormal muscle tone.      Coordination: Coordination is intact. Coordination normal.      Gait: Gait is intact. Gait normal.   Psychiatric:         Mood and Affect: Mood normal.         Behavior: Behavior normal.         ED Course        Procedures              EKG Interpretation:      Interpreted by Jeet Chamberlain MD  Time reviewed: 11:33 AM  Symptoms at time of EKG: Rapid palpitations, stroke symptoms  Rhythm: normal sinus   Rate: 118  Axis: normal  Ectopy: none  Conduction: normal  ST Segments/ T Waves: No acute ST-T wave changes. Nonspecific ST depression and  Q Waves: none  Comparison to prior: No significant change from 5/23/19  Clinical Impression: Sinus tachycardia, nonspecific ST-T wave changes which do not appear changed from baseline.      The patient has stroke symptoms:         ED Stroke specific documentation           NIHSS PDF     Patient last known well time: 9 PM last evening (13.75 hours ago when she developed dizziness and rapid palpitations), and 8:45 AM (2 hours ago when she developed left facial droop and difficulty closing left eyelid).  ED Provider first to bedside at: 11:00 AM  Stroke Neurology Consult at:11:14 AM - Dr. Larson  CT Results received at: 10:27 AM.  Radiologist reports possible right putamen mass.  We will proceed with CT evaluation.  Reviewed CT results with Dr. Larson at: 11:49 AM.  Code stroke escalated, will proceed with MRI evaluation.    Thrombolytics:   Not given due to minor/isolated/quickly resolving symptoms. - Minor isolated symptoms, possible stroke mimic (Bell's palsy), possible     If treating with thrombolytics: Ensure SBP<180 and DBP<105 prior to treatment with thrombolytics.  Administering thrombolytics after  treatment with IV labetalol, hydralazine, or nicardipine is reasonable once BP control is established.    Endovascular Retrieval:  Not initiated due to absence of proximal vessel occlusion    National Institutes of Health Stroke Scale (Baseline)  Time Performed: 11:00 AM     Score    Level of consciousness: (0)   Alert, keenly responsive    LOC questions: (0)   Answers both questions correctly    LOC commands: (0)   Performs both tasks correctly    Best gaze: (0)   Normal    Visual: (0)   No visual loss    Facial palsy: (2)   Partial paralysis (total/near total of lower face)    Motor arm (left): (0)   No drift    Motor arm (right): (0)   No drift    Motor leg (left): (0)   No drift    Motor leg (right): (0)   No drift    Limb ataxia: (0)   Absent    Sensory: (0)   Normal- no sensory loss    Best language: (0)   Normal- no aphasia    Dysarthria: (0)   Normal    Extinction and inattention: (0)   No abnormality        Total Score:  2        Stroke Mimics were considered (including migraine headache, seizure disorder, hypoglycemia (or hyperglycemia), head or spinal trauma, CNS infection, Toxin ingestion and shock state (e.g. sepsis) .    National Institutes of Health Stroke Scale  Time Performed: 11:32 AM    Total Score: 2  (unchanged from last stroke score)      National Institutes of Health Stroke Scale  Time Performed: 11:45 AM  Total Score: 2  (unchanged from last stroke score)      11:49 AM - Code stroke de-escalated.         Results for orders placed or performed during the hospital encounter of 11/12/21 (from the past 24 hour(s))   Glucose by meter   Result Value Ref Range    GLUCOSE BY METER POCT 84 70 - 99 mg/dL   INR   Result Value Ref Range    INR 0.94 0.85 - 1.15   Partial thromboplastin time   Result Value Ref Range    aPTT 33 22 - 38 Seconds   D dimer quantitative   Result Value Ref Range    D-Dimer Quantitative 0.31 0.00 - 0.50 ug/mL FEU    Narrative    This D-dimer assay is intended for use in  conjunction with a clinical pretest probability assessment model to exclude pulmonary embolism (PE) and deep venous thrombosis (DVT) in outpatients suspected of PE or DVT. The cut-off value is 0.50 ug/mL FEU.   CBC with Platelets & Differential    Narrative    The following orders were created for panel order CBC with Platelets & Differential.  Procedure                               Abnormality         Status                     ---------                               -----------         ------                     CBC with platelets and d...[[id number]]                      Final result                 Please view results for these tests on the individual orders.   Basic metabolic panel   Result Value Ref Range    Sodium 140 133 - 144 mmol/L    Potassium 3.0 (L) 3.4 - 5.3 mmol/L    Chloride 103 94 - 109 mmol/L    Carbon Dioxide (CO2) 23 20 - 32 mmol/L    Anion Gap 14 3 - 14 mmol/L    Urea Nitrogen 14 7 - 30 mg/dL    Creatinine 0.66 0.52 - 1.04 mg/dL    Calcium 9.4 8.5 - 10.1 mg/dL    Glucose 89 70 - 99 mg/dL    GFR Estimate >90 >60 mL/min/1.73m2   Troponin I   Result Value Ref Range    Troponin I <0.015 0.000 - 0.045 ug/L   CBC with platelets and differential   Result Value Ref Range    WBC Count 9.3 4.0 - 11.0 10e3/uL    RBC Count 5.13 3.80 - 5.20 10e6/uL    Hemoglobin 15.7 11.7 - 15.7 g/dL    Hematocrit 46.4 35.0 - 47.0 %    MCV 90 78 - 100 fL    MCH 30.6 26.5 - 33.0 pg    MCHC 33.8 31.5 - 36.5 g/dL    RDW 12.1 10.0 - 15.0 %    Platelet Count 337 150 - 450 10e3/uL    % Neutrophils 50 %    % Lymphocytes 35 %    % Monocytes 8 %    % Eosinophils 6 %    % Basophils 1 %    % Immature Granulocytes 0 %    NRBCs per 100 WBC 0 <1 /100    Absolute Neutrophils 4.7 1.6 - 8.3 10e3/uL    Absolute Lymphocytes 3.2 0.8 - 5.3 10e3/uL    Absolute Monocytes 0.7 0.0 - 1.3 10e3/uL    Absolute Eosinophils 0.5 0.0 - 0.7 10e3/uL    Absolute Basophils 0.1 0.0 - 0.2 10e3/uL    Absolute Immature Granulocytes 0.0 <=0.0 10e3/uL    Absolute  NRBCs 0.0 10e3/uL   [place] Draw    Narrative    The following orders were created for panel order [place] Draw.  Procedure                               Abnormality         Status                     ---------                               -----------         ------                     Extra Red Top Tube[[id number]]                                                            Please view results for these tests on the individual orders.   Lyme Disease Bridgette with reflex to WB Serum   Result Value Ref Range    Lyme Disease Antibodies Total 0.04 <0.90   Magnesium   Result Value Ref Range    Magnesium 2.1 1.6 - 2.3 mg/dL   TSH with free T4 reflex   Result Value Ref Range    TSH 5.71 (H) 0.40 - 4.00 mU/L   T4 free   Result Value Ref Range    Free T4 0.94 0.76 - 1.46 ng/dL   CT Head w/o Contrast    Narrative    CT SCAN OF THE HEAD WITHOUT CONTRAST   11/12/2021 11:12 AM     HISTORY: Code stroke to evaluate for potential thrombolysis and  thrombectomy. Left facial droop.     TECHNIQUE:  Axial images of the head and coronal reformations without  IV contrast material. Radiation dose for this scan was reduced using  automated exposure control, adjustment of the mA and/or kV according  to patient size, or iterative reconstruction technique.    COMPARISON: None.    FINDINGS: There is a small focal area of hypodensity centered along  the posterior left lentiform nucleus with surrounding heterogeneous  hyperdensity. This area measures approximately 16-17 mm in greatest  dimension in the axial plane. There is no significant associated mass  effect.    There is prominence of the extra-axial space along the left  frontotemporal opercular region, probably representing an arachnoid  cyst. Mild associated widening of the left sylvian fissure overlying  the left insular cortex and mild local mass effect on the lateral left  cerebral hemisphere. Mild narrowing of the supratentorial ventricular  system without definite evidence for  hydrocephalus or ventricular  entrapment. The brain parenchyma and extra-axial spaces otherwise  appear normal. No midline shift/herniation.    Visualized orbits, paranasal sinuses, and mastoids appear clear.      Impression    IMPRESSION:  1. Indeterminate area of heterogeneous density centered in the  posterior aspect of the left lentiform nucleus, as described.  Differential considerations would include small recent infarct with  associated petechial hemorrhage, small recent primary parenchymal  hemorrhage, vascular malformation such as a cavernoma, or possibly a  small mass/neoplasm. MRI of the brain without and with contrast is  recommended for further characterization.  2. Left frontotemporal opercular region probable arachnoid cyst with  mild mass effect. No midline shift/herniation.    The findings were discussed with Dr. Chamberlain by myself at approximately  11:29 AM on 11/12/2021.    REKHA RAPP MD         SYSTEM ID:  [id number]   CTA Head Neck with Contrast    Narrative    CT ANGIOGRAM OF THE HEAD AND NECK WITH CONTRAST  11/12/2021 11:18 AM     HISTORY: Code stroke to evaluate for potential thrombolysis and  thrombectomy.  Left-sided facial droop.    TECHNIQUE:  CT angiography with an injection of 70 mL Isovue-370 IV  with scans through the head and neck. Images were transferred to a  separate 3-D workstation where multiplanar reformations and 3-D images  were created. Estimates of carotid stenoses are made relative to the  distal internal carotid artery diameters except as noted. Radiation  dose for this scan was reduced using automated exposure control,  adjustment of the mA and/or kV according to patient size, or iterative  reconstruction technique.    COMPARISON: CT head of same day.     CT ANGIOGRAM HEAD FINDINGS:  The major intracranial arteries including  the proximal branches of the anterior cerebral, middle cerebral, and  posterior cerebral arteries appear patent without vascular cutoff.  There is  an tiny laterally directed focal outpouching arising from the  mid cavernous segment of the right internal carotid artery (series 6  image 360) measuring approximately 1-2 mm. There is a predominantly  fetal origin of the right posterior cerebral artery from the anterior  circulation, a normal variant. Azygos configuration of the A2 segments  of the anterior cerebral arteries, a normal variant. No definite  high-grade stenosis or large vessel occlusion identified involving the  major proximal branches of the anterior and posterior cerebral  circulation. Please see separate report from noncontrast head CT of  same day for details regarding nonvascular intracranial findings.    CT ANGIOGRAM NECK FINDINGS:   Common origin of the brachiocephalic and left common carotid arteries,  a normal variant. No significant stenosis at the origins of the great  vessels.    Right carotid artery: The right common and internal carotid arteries  are patent. Mild atherosclerotic disease at the carotid bifurcation  and proximal internal carotid artery with less than 50% stenosis by  NASCET criteria.     Left carotid artery: The left common and internal carotid arteries are  patent. No significant stenosis or atherosclerotic disease in the  carotid artery.     Vertebral arteries: Vertebral arteries are patent without evidence of  dissection. No significant stenosis.     Other findings: The bilateral third maxillary molars appear to be  impacted. Multilevel degenerative changes are noted in the cervical  and thoracic spine.       Impression    IMPRESSION:  1. No high-grade stenosis or large vessel occlusion involving the  major intracranial arteries.  2. There is a tiny (1-2 mm) laterally directed outpouching of the  cavernous segment of the right internal carotid artery, which may  represent a vascular infundibulum or tiny aneurysm.  3. Intracranial vascular variants, as described.  4. Mild right carotid bifurcation atherosclerosis  without significant  stenosis. The left cervical carotid arteries and the bilateral  cervical vertebral arteries are patent.    REKHA RAPP MD         SYSTEM ID:  [id number]   MR Brain w/o & w Contrast    Narrative    MR BRAIN WITHOUT AND WITH CONTRAST 11/12/2021 2:50 PM    INDICATION: Brain mass or lesion; Left facial droop and ptosis,  question posterior left lentiform nucleus mass on CT evaluation.  TECHNIQUE: Noncontrast and contrast enhanced MRI of the brain.  CONTRAST: 7.5 mL Gadavist  COMPARISON: Head CT 11/12/2021    FINDINGS:  There is no restricted diffusion. Mild mucosal thickening  within the ethmoid air cells. Paranasal sinuses are otherwise free  from significant disease. A few mastoid air cells on each side contain  a small amount of fluid. There is a 10 x 7 mm ovoid T2 hyperintense  enhancing mass within the upper lateral right intraconal orbit. No  hydrocephalus. Intracranial flow voids are intact. Left frontal  temporal region arachnoid cyst. The left posterior basal ganglia  abnormality identified on CT corresponds to an area of low T2 signal  intensity with considerable signal loss in the susceptibility  sensitive sequence. On the T2 imaging this measures approximately 1.5  x 1 cm in size. It demonstrates some intrinsic T1 hyperintensity  probably with minor associated enhancement. No surrounding edema.  Linear, probably vascular enhancement below this within the temporal  white matter stem just above the hippocampus probably represents a  small developmental venous anomaly. There are scattered foci of  T2/FLAIR hyperintensity within the cerebral white matter that are  nonspecific but most commonly reflect the sequela of chronic small  vessel ischemic disease.      Impression    IMPRESSION:   1.  1.5 cm ovoid lesion within the posterior left basal ganglia  contains some chronic blood products and corresponds to the  abnormality identified on head CT. While not entirely specific this is  favored  to represent a small cavernoma. There is likely a small  developmental venous anomaly immediately below this lesion.  2.  1 cm ovoid mass within the right orbit is nonspecific but most  commonly represents an orbital hemangioma/cavernous malformation.  3.  No acute infarct, hemorrhage or hydrocephalus.  4.  Mild burden of presumed chronic small vessel ischemic changes  within white matter.  5.  Left frontal temporal region arachnoid cyst.    FATOUMATA SHEPPARD MD         SYSTEM ID:  [id number]   Asymptomatic COVID-19 Virus (Coronavirus) by PCR Nasopharyngeal    Specimen: Nasopharyngeal; Swab   Result Value Ref Range    SARS CoV2 PCR Negative Negative    Narrative    Testing was performed using the [facility]  SARS-CoV-2 & Influenza A/B Assay on the [employer]  [employer]  System.  This test should be ordered for the detection of SARS-COV-2 in individuals who meet SARS-CoV-2 clinical and/or epidemiological criteria. Test performance is unknown in asymptomatic patients.  This test is for in vitro diagnostic use under the FDA EUA for laboratories certified under CLIA to perform moderate and/or high complexity testing. This test has not been FDA cleared or approved.  A negative test does not rule out the presence of PCR inhibitors in the specimen or target RNA in concentration below the limit of detection for the assay. The possibility of a false negative should be considered if the patient's recent exposure or clinical presentation suggests COVID-19.  [facility] are certified under the Clinical Laboratory Improvement Amendments of 1988 (CLIA-88) as qualified to perform moderate and/or high complexity laboratory testing.       Medications   0.9% sodium chloride BOLUS (0 mLs Intravenous Not Given 11/12/21 7478)   iopamidol (ISOVUE-370) solution 100 mL (70 mLs Intravenous Given 11/12/21 1108)   sodium chloride 0.9 % bag 500mL for CT scan flush use (100 mLs As instructed Given 11/12/21 1109)   valACYclovir (VALTREX)  tablet 1,000 mg (1,000 mg Oral Given 11/12/21 1243)   predniSONE (DELTASONE) tablet 60 mg (60 mg Oral Given 11/12/21 1244)   LORazepam (ATIVAN) injection 2 mg (2 mg Intravenous Given 11/12/21 1412)   gadobutrol (GADAVIST) injection 7.5 mL (7.5 mLs Intravenous Given 11/12/21 1420)   potassium chloride (KLOR-CON) Packet 20 mEq (20 mEq Oral Given 11/12/21 1917)     4:47 PM - I reviewed the patient's history, clinical findings, CT and MRI study results with Dr. Weeks, Neurosurgeon on-call.  No further evaluation is felt to be currently indicated or warranted and she can follow-up as an outpatient with the Neurosurgery/endovascular clinic.    She reports chronic tachycardia, history of previous beta-blocker therapy, states her heart rate is often 120-140 and this is not unusual for her.  She reports prior cardiac monitoring studies, Echo and ?  Cardiology clinic follow-up.  He is comfortable discharge home with deferral of further evaluation of sinus tachycardia.  While resting and sleeping in the ED her heart rate is ~ 115.    At time of discharge, questions answered and she now volunteers that she has recently had decreased taste sensation, waxy taste in her mouth which began yesterday and rhinorrhea for 1-2 weeks. Will check COVID-19 nasopharyngeal study.    Critical Care time:  was 65 minutes for this patient excluding procedures.    Assessments & Plan (with Medical Decision Making)   61 year old female who presents to the ED for evaluation of dizziness and rapid palpitations which began 13.75 hours ago (9:00 PM last evening) and left facial droop and difficulty closing her left eyelid 2 hours ago (8:45 AM).  Left facial weakness involving the forehead and lower face with no facial rash, eye pain or other eye abnormality.  No other neurologic deficit or abnormality. Prior history of similar rapid palpitations without syncope and she reports prior evaluation remarkable for benign tachycardia of unclear etiology, ?  treated with beta-blocker/metoprolol which was discontinued quite some time by her PMD, for unclear reason.  She reports prior evaluation included cardiac monitoring study, Echo and ? Cardiology referral.  Due to possible stroke symptoms a code stroke was initiated and her evaluation was unremarkable for any evidence of acute CVA and her symptoms appear secondary to Bell's palsy, with sinus tachycardia of unclear etiology.  After CT/CTA head neck stroke evaluation studies showed no evidence of acute CVA or vascular occlusion, MRI brain without and with contrast studies was performed for further evaluation, and evaluation of incidental CT anomalies.  MRI evaluation remarkable for no evidence of CVA and:  IMPRESSION:   1.  1.5 cm ovoid lesion within the posterior left basal ganglia contains some chronic blood products and corresponds to the  abnormality identified on head CT. While not entirely specific this is favored to represent a small cavernoma. There is likely a small developmental venous anomaly immediately below this lesion.  2.  1 cm ovoid mass within the right orbit is nonspecific but most commonly represents an orbital hemangioma/cavernous malformation.  3.  No acute infarct, hemorrhage or hydrocephalus.  4.  Mild burden of presumed chronic small vessel ischemic changes within white matter.  5.  Left frontal temporal region arachnoid cyst.     I reviewed the case with the neurosurgeon on-call at [facility] who reviewed her brain imaging study and recommended no further evaluation at the present time, with outpatient follow-up in Neurosurgery/endovascular clinic.  I made a referral for her for this.  In addition I made a neurology clinic referral for follow-up of Bell's palsy which will be treated with valacyclovir and prednisone next 1 week.  A Lyme screen was sent and was negative.  No history of known tick bite or other symptoms of Lyme disease.  Her baseline heart rate is in the 115 range at  rest, but increases intermittently to the 130s-140 range, asymptomatic.  Etiology of this is unclear, but appears chronic in nature with prior beta-blocker therapy.  EKG is unremarkable except for sinus tachycardia, troponin and D-dimer are negative and electrolytes and thyroid screen all remarkable for mild hypokalemia with potassium 3.0.  She was given oral potassium will be discharged on a potassium supplement X1 week with plan for follow-up in clinic within the next week to discuss continued potassium supplementation, she is on hydrochlorothiazide.  I will initiate metoprolol ER 25 mg daily and instructed that she could increase this to 37.5 mg daily if needed to try to keep heart rate less than 100, with instructions that she could decrease losartan if needed if her BP lowers too much.  She expressed understanding of these issues and will monitor her blood pressure and heart rate, record and report these to her primary care provider and follow-up this is possible in the next week.  She was provided instructions for supportive care and will return as needed for worsened condition or worsening symptoms, or new problems or concerns.      I have reviewed the nursing notes.    I have reviewed the findings, diagnosis, plan and need for follow up with the patient and her daughter.    Discharge Medication List as of 11/12/2021  7:56 PM      START taking these medications    Details   metoprolol succinate ER (TOPROL-XL) 25 MG 24 hr tablet Take 1 tablet (25 mg) by mouth daily, Disp-30 tablet, R-0, E-Prescribe      potassium chloride ER (K-TAB) 20 MEQ CR tablet Take 1 tablet (20 mEq) by mouth 2 times daily for 7 days, Disp-14 tablet, R-0, E-Prescribe      predniSONE (DELTASONE) 20 MG tablet Take three tablets (60mg) by mouth once daily for 1 week., Disp-21 tablet, R-0, E-Prescribe      valACYclovir (VALTREX) 1000 mg tablet Take 1 tablet (1,000 mg) by mouth 3 times daily for 7 days, Disp-21 tablet, R-0, E-Prescribe              Final diagnoses:   Left-sided Bell's palsy   Intracranial vascular disorder   Sinus tachycardia   Hypokalemia - Mild, potassium level 3.0       11/12/2021   [facility] EMERGENCY DEPT     Jeet Chamberlain MD  11/13/21 [id number]

## 2021-11-12 NOTE — CONSULTS
[facility]    Stroke Telephone Note    I was called by Jeet Chamberlain Md on 11/12/21 regarding patient Mechelle Cee. The patient is a 61 year old female who was feeling unwell and dizzy and tachycardiac last evening who this morning around 9am noticed a L facial droop. She is unable to close L eye fully per Dr. Chamberlain but has no other focal deficits.  BP is 206/98    Stroke Code Data (for stroke code without tele)  Stroke code activated 11/12/21   1103   Stroke provider first response  11/12/21   1106            Last known normal 11/11/21   1800        Time of discovery   (or onset of symptoms) 11/12/21   0900   Head CT read by Stroke Neuro Dr/Provider 11/12/21   1117   Was stroke code de-escalated? Yes 11/12/21 1146  presence of contraindications for both intravenous and intra-arterial stroke treatments       Imaging Findings   CT shows:  1. Indeterminate area of heterogeneous density centered in the  posterior aspect of the left lentiform nucleus, as described.  Differential considerations would include small recent infarct with  associated petechial hemorrhage, small recent primary parenchymal  hemorrhage, vascular malformation such as a cavernoma, or possibly a  small mass/neoplasm. MRI of the brain without and with contrast is  recommended for further characterization.  2. Left frontotemporal opercular region arachnoid cyst with mild mass  effect. No midline shift/herniation.    CTA  1. No high-grade stenosis or large vessel occlusion involving the  major intracranial arteries.  2. There is a tiny (1-2 mm) laterally directed outpouching of the  cavernous segment of the right internal carotid artery, which may  represent a vascular infundibulum or tiny aneurysm.  3. Intracranial vascular variants, as described.  4. Mild right carotid bifurcation atherosclerosis without significant  stenosis. The left cervical carotid arteries and the bilateral  cervical vertebral arteries  "are patent.      Thrombolytic Treatment   Not given due to contraindication (possible L lentiform nucleus mass or blooed) and unclear or unfavorable risk-benefit profile for extended window thrombolysis beyond the conventional 4.5 hour time window.    Endovascular Treatment  Not initiated due to absence of proximal vessel occlusion    Impression  1. L facial droop likely peripheral 7th nerve palsy by description  2. Indeterminate area of L side lentiform nucleus with heterogeneous density, etiology unknown at this time    Recommendations   - SBP <160  - Await MRI brain---  with and without contrast    My recommendations are based on the information provided over the phone by Mechelle Cee's in-person providers. They are not intended to replace the clinical judgment of her in-person providers. I was not requested to personally see or examine the patient at this time.    Discussed with my attending Dr. Jamal Larson PA-C  Neurology  11/12/2021 11:54 AM  To page me or covering stroke neurology team member, click here: AMCOM   Choose \"On Call\" tab at top, then search dropdown box for \"Neurology Adult\", select location, press Enter, then look for stroke/neuro ICU/telestroke.        "

## 2021-11-13 LAB — HOLD SPECIMEN: NORMAL

## 2021-11-23 NOTE — TELEPHONE ENCOUNTER
RECORDS RECEIVED FROM: Internal   REASON FOR VISIT: Intracranial vascular disorder    Date of Appt: 12/1/21   NOTES (FOR ALL VISITS) STATUS DETAILS   OFFICE NOTE from referring provider Internal SEE INPATIENT NOTES   OFFICE NOTE from other specialist N/A    DISCHARGE SUMMARY from hospital N/A    DISCHARGE REPORT from the ER Internal [facility]:  11/12/21   OPERATIVE REPORT N/A    MEDICATION LIST Internal    IMAGING  (FOR ALL VISITS)     EMG N/A    EEG N/A    LUMBAR PUNCTURE N/A    DASHAWN SCAN N/A    ULTRASOUND (CAROTID BILAT) *VASCULAR* N/A    MRI (HEAD, NECK, SPINE) Internal [facility]:  MRI Brain 11/12/21   CT (HEAD, NECK, SPINE) Internal [facility]:  CTA Head Neck 11/12/21  CT Head 11/12/21

## 2021-11-26 ENCOUNTER — TELEPHONE (OUTPATIENT)
Dept: NEUROSURGERY | Facility: CLINIC | Age: 61
End: 2021-11-26
Payer: COMMERCIAL

## 2021-11-26 NOTE — TELEPHONE ENCOUNTER
Writer LV for pt explaining that Dr. Luna reviewed pt referral and is recommending she is seen by Dr. Andujar or Dr. Ogden    Please schedule a new visit, in person or virtual, with Dr. Andujar or Dr. Ogden for next available.    Abi Girard

## 2021-12-01 ENCOUNTER — VIRTUAL VISIT (OUTPATIENT)
Dept: NEUROSURGERY | Facility: CLINIC | Age: 61
End: 2021-12-01
Payer: COMMERCIAL

## 2021-12-01 ENCOUNTER — PRE VISIT (OUTPATIENT)
Dept: NEUROSURGERY | Facility: CLINIC | Age: 61
End: 2021-12-01

## 2021-12-01 DIAGNOSIS — D18.00 CAVERNOMA: Primary | ICD-10-CM

## 2021-12-01 PROCEDURE — 99203 OFFICE O/P NEW LOW 30 MIN: CPT | Mod: 95 | Performed by: NEUROLOGICAL SURGERY

## 2021-12-01 NOTE — PROGRESS NOTES
Service Date: 12/01/2021    I had the pleasure to talk to Mechelle today by telephone during a neurosurgical phone clinic visit.    Briefly, Mechelle is a 61-year-old woman from [place], [place], who is employed at [employer] there.  She would note that this is [employer] in [place].  This is not [employer] in [place].    Mechelle has been referred to me for evaluation and treatment of a possible cavernoma.    Mechelle is a healthy woman without any significant medical problems.  The problems listed include hyperlipidemia, chronic cough, essential hypertension and obesity.  She was in her normal state of health until 2 weeks ago when she suddenly developed left facial palsy.  This is obviously concerning and she was taken to the hospital where an MRI of the brain was performed.  Ultimately, she was diagnosed as having Bell's palsy, which over the last 2 weeks has been improving.  The MRI of her brain, however, did demonstrate 3 findings:      1.  A possible tiny infundibulum versus aneurysm in the right cavernous segment of the internal carotid artery.  2.  Hemangioma versus cavernoma of the right orbit.  3.  Cavernoma in the left basal ganglia.      She has been referred to me for evaluation and treatment of these intracranial problems.    On review of history, she has never had any seizures, paralysis, weakness of the right side of her body or sudden severe headaches.  As such, I think the cavernoma is incidental and asymptomatic.  On the MRI, there is evidence of a cavernoma in the left basal ganglia that measures 10 x 14 mm.  Based on the imaging characteristics, it is clear to me that this is a cavernoma and not some other pathology.  Interestingly, there is a CSF cystic space on the left sylvian fissure.  As such, there is a large space that extends down to the insula and the cavernoma is just on the other side of the insula.  This actually would provide a very nice surgical corridor for  resection of this cavernoma if it was needed.    Today, I talked to her about the pathology of a cavernoma and how it is different than an aneurysm or AVM.  I talked about the natural history and the tendency of these not to hemorrhage, but rather to leak blood.  I did talk about the potential symptoms related to that such as weakness of the right arm, leg, face, numbness of similar areas, headache and then seizures.  Given the fact that this has likely not hemorrhaged before, I am inclined to observe this lesion with a repeat MR angiogram in 1 year with followup at that point in time.  She is in agreement with this plan.  I will discuss this at our cerebrovascular case conference next week and then get back to her with regard to that discussion, but as it stands now, I would like to plan on a repeat MRI of the brain in 1 year with a followup visit at that point in time.  She is in agreement with this plan.    Ward Andujar MD        D: 2021   T: 2021   MT: MARTHA    Name:     ONEIL BELTRAN  MRN:      [id number]        Account:      [id number]   :      1960           Service Date: 2021       Document: [id number]

## 2021-12-01 NOTE — LETTER
12/1/2021       RE: Mechelle Cee  [address] [address]     Dear Colleague,    Thank you for referring your patient, Mechelle Cee, to the [facility] NEUROSURGERY CLINIC [place] at [facility]. Please see a copy of my visit note below.    Service Date: 12/01/2021    I had the pleasure to talk to Mechelle today by telephone during a neurosurgical phone clinic visit.    Briefly, Mechelle is a 61-year-old woman from [place], [place], who is employed at [employer] there.  She would note that this is [employer] in [place].  This is not [employer] in [place].    Mechelle has been referred to me for evaluation and treatment of a possible cavernoma.    Mechelle is a healthy woman without any significant medical problems.  The problems listed include hyperlipidemia, chronic cough, essential hypertension and obesity.  She was in her normal state of health until 2 weeks ago when she suddenly developed left facial palsy.  This is obviously concerning and she was taken to the hospital where an MRI of the brain was performed.  Ultimately, she was diagnosed as having Bell's palsy, which over the last 2 weeks has been improving.  The MRI of her brain, however, did demonstrate 3 findings:      1.  A possible tiny infundibulum versus aneurysm in the right cavernous segment of the internal carotid artery.  2.  Hemangioma versus cavernoma of the right orbit.  3.  Cavernoma in the left basal ganglia.      She has been referred to me for evaluation and treatment of these intracranial problems.    On review of history, she has never had any seizures, paralysis, weakness of the right side of her body or sudden severe headaches.  As such, I think the cavernoma is incidental and asymptomatic.  On the MRI, there is evidence of a cavernoma in the left basal ganglia that measures 10 x 14 mm.  Based on the imaging characteristics, it is  clear to me that this is a cavernoma and not some other pathology.  Interestingly, there is a CSF cystic space on the left sylvian fissure.  As such, there is a large space that extends down to the insula and the cavernoma is just on the other side of the insula.  This actually would provide a very nice surgical corridor for resection of this cavernoma if it was needed.    Today, I talked to her about the pathology of a cavernoma and how it is different than an aneurysm or AVM.  I talked about the natural history and the tendency of these not to hemorrhage, but rather to leak blood.  I did talk about the potential symptoms related to that such as weakness of the right arm, leg, face, numbness of similar areas, headache and then seizures.  Given the fact that this has likely not hemorrhaged before, I am inclined to observe this lesion with a repeat MR angiogram in 1 year with followup at that point in time.  She is in agreement with this plan.  I will discuss this at our cerebrovascular case conference next week and then get back to her with regard to that discussion, but as it stands now, I would like to plan on a repeat MRI of the brain in 1 year with a followup visit at that point in time.  She is in agreement with this plan.    Ward Andujar MD

## 2021-12-02 NOTE — PATIENT INSTRUCTIONS
Will discuss this at our cerebrovascular case conference next week and then get back to her with regard to that discussion, but as it stands now, I would like to plan on a repeat MRI of the brain in 1 year with a followup visit at that point in time.     If you have any questions please contact me at [phone], option 3.    Geovanny Mcdonald RN, CNRN  Stroke & Endovascular Care Coordinator    Thank you for choosing [facility] for your health care needs.

## 2022-04-24 ENCOUNTER — HEALTH MAINTENANCE LETTER (OUTPATIENT)
Age: 62
End: 2022-04-24

## 2022-08-19 ENCOUNTER — HOSPITAL ENCOUNTER (OUTPATIENT)
Dept: MAMMOGRAPHY | Facility: CLINIC | Age: 62
Discharge: HOME | End: 2022-08-19
Attending: NURSE PRACTITIONER | Admitting: NURSE PRACTITIONER
Payer: COMMERCIAL

## 2022-08-19 DIAGNOSIS — Z12.31 VISIT FOR SCREENING MAMMOGRAM: ICD-10-CM

## 2022-08-19 PROCEDURE — 77067 SCR MAMMO BI INCL CAD: CPT

## 2022-10-03 ENCOUNTER — TELEPHONE (OUTPATIENT)
Dept: NEUROSURGERY | Facility: CLINIC | Age: 62
End: 2022-10-03

## 2022-10-03 DIAGNOSIS — D18.00 CAVERNOMA: Primary | ICD-10-CM

## 2022-10-04 ENCOUNTER — TELEPHONE (OUTPATIENT)
Dept: NEUROSURGERY | Facility: CLINIC | Age: 62
End: 2022-10-04

## 2022-10-05 ENCOUNTER — TELEPHONE (OUTPATIENT)
Dept: NEUROSURGERY | Facility: CLINIC | Age: 62
End: 2022-10-05

## 2022-11-19 ENCOUNTER — HEALTH MAINTENANCE LETTER (OUTPATIENT)
Age: 62
End: 2022-11-19

## 2023-06-01 ENCOUNTER — HEALTH MAINTENANCE LETTER (OUTPATIENT)
Age: 63
End: 2023-06-01

## 2023-08-16 ENCOUNTER — TELEPHONE (OUTPATIENT)
Dept: NEUROSURGERY | Facility: CLINIC | Age: 63
End: 2023-08-16
Payer: COMMERCIAL

## 2023-08-16 NOTE — TELEPHONE ENCOUNTER
Health Call Center    Phone Message    May a detailed message be left on voicemail: yes     Reason for Call: Other: Patient is calling to schedule a follow up with Dr. Andujar and MRI. Looks like she was suppose to be seen 10/2022 but never scheduled. The MRI orders are  as well. Please review and call patient back to schedule.       Action Taken: Message routed to:  [facility] ([facility]): [facility]    Travel Screening: Not Applicable

## 2023-09-12 ENCOUNTER — TELEPHONE (OUTPATIENT)
Dept: FAMILY MEDICINE | Facility: CLINIC | Age: 63
End: 2023-09-12
Payer: COMMERCIAL

## 2023-09-12 NOTE — TELEPHONE ENCOUNTER
Pt asks for lorazepam for upcoming MRI that has been ordered by neurologist.    Pt has not seen Dr Hope since 1/2020.    Pt says that she has changed PCP to different provider and is now seeing Chikis Watt NP.  Directed pt to make this request from current PCP.    Lolita Rowell RN

## 2023-09-25 ENCOUNTER — TELEPHONE (OUTPATIENT)
Dept: NEUROSURGERY | Facility: CLINIC | Age: 63
End: 2023-09-25
Payer: COMMERCIAL

## 2023-09-29 ENCOUNTER — ANCILLARY PROCEDURE (OUTPATIENT)
Dept: MRI IMAGING | Facility: CLINIC | Age: 63
End: 2023-09-29
Attending: NEUROLOGICAL SURGERY
Payer: COMMERCIAL

## 2023-09-29 DIAGNOSIS — D18.00 CAVERNOMA: ICD-10-CM

## 2023-09-29 PROCEDURE — 70553 MRI BRAIN STEM W/O & W/DYE: CPT | Mod: GC | Performed by: RADIOLOGY

## 2023-09-29 PROCEDURE — A9585 GADOBUTROL INJECTION: HCPCS | Mod: JZ | Performed by: RADIOLOGY

## 2023-09-29 RX ORDER — GADOBUTROL 604.72 MG/ML
7.5 INJECTION INTRAVENOUS ONCE
Status: COMPLETED | OUTPATIENT
Start: 2023-09-29 | End: 2023-09-29

## 2023-09-29 RX ADMIN — GADOBUTROL 7.5 ML: 604.72 INJECTION INTRAVENOUS at 11:43

## 2023-10-17 ENCOUNTER — VIRTUAL VISIT (OUTPATIENT)
Dept: NEUROSURGERY | Facility: CLINIC | Age: 63
End: 2023-10-17
Payer: COMMERCIAL

## 2023-10-17 DIAGNOSIS — D18.00 CAVERNOMA: Primary | ICD-10-CM

## 2023-10-17 PROCEDURE — 99213 OFFICE O/P EST LOW 20 MIN: CPT | Mod: 93 | Performed by: NEUROLOGICAL SURGERY

## 2023-10-17 ASSESSMENT — PAIN SCALES - GENERAL: PAINLEVEL: NO PAIN (0)

## 2023-10-17 NOTE — LETTER
10/17/2023       RE: Mechelle Cee  [address] [address]     Dear Colleague,    Thank you for referring your patient, Mechelle Cee, to the [facility] NEUROSURGERY CLINIC [place] at [facility]. Please see a copy of my visit note below.      I had the pleasure to talk to Mechelle today by telephone during a neurosurgical phone visit.  She gave consent for this visit.    Briefly Mechelle is a 63-year-old woman now known for couple years.  She has a known left basal ganglia cavernoma.  I had seen her 2 years ago with regards to this.  Given the fact that she was asymptomatic and it was not clear that this had ever hemorrhage my recommendation was serial imaging and follow-up.  She comes in today with a repeat MRI.  The MRI demonstrates the exact same lesion that has not changed in size shape or appearance.    She has not had any neurologic symptoms related to this at all.    Given that this is asymptomatic, she does not have any neurologic symptoms, it is not hemorrhage and it is stable on repeat imaging I would again recommend serial observation with a repeat MRI of the brain in 2 years.  She is comfortable with this plan.      Again, thank you for allowing me to participate in the care of your patient.      Sincerely,    Ward Andujar MD

## 2023-10-17 NOTE — NURSING NOTE
Please send pt a [employer] link to [phone] when ready for visit. Pt hasis prepared and ready for link    Is the patient currently in the state of [place]? YES    Visit mode:VIDEO    If the visit is dropped, the patient can be reconnected by: VIDEO VISIT: Text to cell phone:   Telephone Information:   Mobile [phone]       Will anyone else be joining the visit? NO  (If patient encounters technical issues they should call [phone])    How would you like to obtain your AVS? MyChart    Are changes needed to the allergy or medication list? Pt stated no changes to allergies and Pt stated no med changes    Reason for visit: RECHECK (Cavernoma/ MRI results)    Lluvia CONLEY

## 2023-10-17 NOTE — PROGRESS NOTES
Virtual Visit Details    Type of service:  Phone Visit     I had the pleasure to talk to Mechelle today by telephone during a neurosurgical phone visit.  She gave consent for this visit.    Briefly Mechelle is a 63-year-old woman now known for couple years.  She has a known left basal ganglia cavernoma.  I had seen her 2 years ago with regards to this.  Given the fact that she was asymptomatic and it was not clear that this had ever hemorrhage my recommendation was serial imaging and follow-up.  She comes in today with a repeat MRI.  The MRI demonstrates the exact same lesion that has not changed in size shape or appearance.    She has not had any neurologic symptoms related to this at all.    Given that this is asymptomatic, she does not have any neurologic symptoms, it is not hemorrhage and it is stable on repeat imaging I would again recommend serial observation with a repeat MRI of the brain in 2 years.  She is comfortable with this plan.

## 2023-10-18 NOTE — PATIENT INSTRUCTIONS
Follow-up with Dr. Andujar in 2 years with an MRI prior to your appointment.    Stroke & Endovascular RN Care Coordinators:    Shavon Lott, RN, BSN  Wilma Mcdonald, RN, CNRN, SCRN    If you have any questions please contact the RN Care Coordinators at [phone], option 1.     After business hours call the  at [phone] and have the Neuro-Interventional Fellow paged.    Thank you for choosing [facility] for your health care needs.

## 2024-03-21 NOTE — TELEPHONE ENCOUNTER
[facility] Call Center    Phone Message    May a detailed message be left on voicemail: yes     Reason for Call: Order(s): Other:   Reason for requested: MRI  Date needed:   Provider name: Dr. Con Min returning a missed call to schedule. She was unable to schedule at this time due to being at work and will call back to schedule. Writer looked for the MRI order and there is not one in the system. Mechelle was under the impression that she would not be needing the MRI this year. Please call Mechelle to inform her if she is needing to schedule an MRI.    Action Taken: Message routed to:  [facility] ([facility]): [facility]    Travel Screening: Not Applicable                                                                      
<--- Click to Launch ICDx for PreOp, PostOp and Procedure

## 2024-05-31 ENCOUNTER — HOSPITAL ENCOUNTER (OUTPATIENT)
Dept: MAMMOGRAPHY | Facility: CLINIC | Age: 64
Discharge: HOME | End: 2024-05-31
Attending: NURSE PRACTITIONER | Admitting: NURSE PRACTITIONER
Payer: COMMERCIAL

## 2024-05-31 DIAGNOSIS — Z12.31 VISIT FOR SCREENING MAMMOGRAM: ICD-10-CM

## 2024-05-31 PROCEDURE — 77063 BREAST TOMOSYNTHESIS BI: CPT

## 2024-06-15 ENCOUNTER — HEALTH MAINTENANCE LETTER (OUTPATIENT)
Age: 64
End: 2024-06-15

## 2024-10-30 ENCOUNTER — TELEPHONE (OUTPATIENT)
Dept: NEUROSURGERY | Facility: CLINIC | Age: 64
End: 2024-10-30
Payer: COMMERCIAL

## 2024-10-30 DIAGNOSIS — D18.00 CAVERNOMA: Primary | ICD-10-CM

## 2024-10-30 NOTE — TELEPHONE ENCOUNTER
Called patient. She's at work and can't schedule appointment w/ Dr. Andujar. 2 yr follow up, rtn vascular neurosurg, in person or virtual. In person or virtual. -KB

## 2024-10-30 NOTE — PROGRESS NOTES
MRI order entered per note from Dr. Andujar on 10/17/23,   Given that this is asymptomatic, she does not have any neurologic symptoms, it is not hemorrhage and it is stable on repeat imaging I would again recommend serial observation with a repeat MRI of the brain in 2 years.   Shavon Lott RN 10/30/2024 11:34 AM

## 2025-01-29 NOTE — PROGRESS NOTES
Mechelle is a 61 year old who is being evaluated via a billable telephone visit.      What phone number would you like to be contacted at? [phone]    How would you like to obtain your AVS? Javid  Phone call duration: 15 minutes     No

## 2025-02-12 ENCOUNTER — TELEPHONE (OUTPATIENT)
Dept: NEUROSURGERY | Facility: CLINIC | Age: 65
End: 2025-02-12
Payer: COMMERCIAL

## 2025-02-12 NOTE — TELEPHONE ENCOUNTER
Called patient and scheduled appointments w/ Dr. Andujar and MRI prior via [employer] workqueue. -KB

## 2025-07-06 ENCOUNTER — HEALTH MAINTENANCE LETTER (OUTPATIENT)
Age: 65
End: 2025-07-06

## 2025-07-08 ENCOUNTER — HOSPITAL ENCOUNTER (OUTPATIENT)
Dept: MAMMOGRAPHY | Facility: CLINIC | Age: 65
Discharge: HOME | End: 2025-07-08
Attending: NURSE PRACTITIONER
Payer: COMMERCIAL

## 2025-07-08 DIAGNOSIS — Z12.31 VISIT FOR SCREENING MAMMOGRAM: ICD-10-CM

## 2025-07-08 PROCEDURE — 77067 SCR MAMMO BI INCL CAD: CPT

## 2025-08-20 ENCOUNTER — TELEPHONE (OUTPATIENT)
Dept: NEUROSURGERY | Facility: CLINIC | Age: 65
End: 2025-08-20
Payer: COMMERCIAL